# Patient Record
Sex: FEMALE | Race: WHITE | NOT HISPANIC OR LATINO | Employment: OTHER | URBAN - METROPOLITAN AREA
[De-identification: names, ages, dates, MRNs, and addresses within clinical notes are randomized per-mention and may not be internally consistent; named-entity substitution may affect disease eponyms.]

---

## 2024-09-05 ENCOUNTER — OFFICE VISIT (OUTPATIENT)
Dept: FAMILY MEDICINE CLINIC | Facility: CLINIC | Age: 74
End: 2024-09-05
Payer: COMMERCIAL

## 2024-09-05 VITALS
WEIGHT: 161 LBS | BODY MASS INDEX: 26.82 KG/M2 | DIASTOLIC BLOOD PRESSURE: 78 MMHG | SYSTOLIC BLOOD PRESSURE: 126 MMHG | RESPIRATION RATE: 16 BRPM | HEIGHT: 65 IN

## 2024-09-05 DIAGNOSIS — F41.9 ANXIETY: ICD-10-CM

## 2024-09-05 DIAGNOSIS — J34.89 LESION OF NOSE: ICD-10-CM

## 2024-09-05 DIAGNOSIS — M47.812 CERVICAL SPONDYLOSIS: ICD-10-CM

## 2024-09-05 DIAGNOSIS — Z76.89 ENCOUNTER TO ESTABLISH CARE WITH NEW DOCTOR: Primary | ICD-10-CM

## 2024-09-05 DIAGNOSIS — R19.7 DIARRHEA, UNSPECIFIED TYPE: ICD-10-CM

## 2024-09-05 DIAGNOSIS — T14.91XA SUICIDE ATTEMPT (HCC): ICD-10-CM

## 2024-09-05 DIAGNOSIS — N39.0 RECURRENT UTI: ICD-10-CM

## 2024-09-05 DIAGNOSIS — F31.4 BIPOLAR DISORDER, CURRENT EPISODE DEPRESSED, SEVERE, WITHOUT PSYCHOTIC FEATURES (HCC): ICD-10-CM

## 2024-09-05 DIAGNOSIS — J44.1 CHRONIC OBSTRUCTIVE PULMONARY DISEASE WITH (ACUTE) EXACERBATION (HCC): ICD-10-CM

## 2024-09-05 PROBLEM — F29 PSYCHOSIS (HCC): Status: ACTIVE | Noted: 2024-05-28

## 2024-09-05 PROBLEM — F32.A DEPRESSION: Status: ACTIVE | Noted: 2024-05-29

## 2024-09-05 PROCEDURE — 99204 OFFICE O/P NEW MOD 45 MIN: CPT

## 2024-09-05 PROCEDURE — G2211 COMPLEX E/M VISIT ADD ON: HCPCS

## 2024-09-05 RX ORDER — FLUOXETINE 40 MG/1
40 CAPSULE ORAL DAILY
COMMUNITY
Start: 2024-06-29

## 2024-09-05 RX ORDER — LAMOTRIGINE 200 MG/1
200 TABLET ORAL 2 TIMES DAILY
COMMUNITY
End: 2024-09-05 | Stop reason: SDUPTHER

## 2024-09-05 RX ORDER — BECLOMETHASONE DIPROPIONATE HFA 80 UG/1
2 AEROSOL, METERED RESPIRATORY (INHALATION) 2 TIMES DAILY
Qty: 10.6 G | Refills: 3 | Status: SHIPPED | OUTPATIENT
Start: 2024-09-05

## 2024-09-05 RX ORDER — CHLORAL HYDRATE 500 MG
1000 CAPSULE ORAL DAILY
COMMUNITY

## 2024-09-05 RX ORDER — METHENAMINE HIPPURATE 1000 MG/1
1 TABLET ORAL
COMMUNITY
Start: 2024-01-30 | End: 2025-01-29

## 2024-09-05 RX ORDER — BUSPIRONE HYDROCHLORIDE 15 MG/1
15 TABLET ORAL 2 TIMES DAILY
COMMUNITY
End: 2024-09-05 | Stop reason: SDUPTHER

## 2024-09-05 RX ORDER — TRAZODONE HYDROCHLORIDE 150 MG/1
300 TABLET ORAL
COMMUNITY
End: 2024-09-05 | Stop reason: SDUPTHER

## 2024-09-05 RX ORDER — CYCLOBENZAPRINE HCL 10 MG
10 TABLET ORAL 2 TIMES DAILY
Qty: 60 TABLET | Refills: 1 | Status: SHIPPED | OUTPATIENT
Start: 2024-09-05

## 2024-09-05 RX ORDER — ALBUTEROL SULFATE 90 UG/1
2 AEROSOL, METERED RESPIRATORY (INHALATION) EVERY 6 HOURS PRN
Qty: 18 G | Refills: 5 | Status: SHIPPED | OUTPATIENT
Start: 2024-09-05

## 2024-09-05 RX ORDER — LAMOTRIGINE 200 MG/1
200 TABLET ORAL 2 TIMES DAILY
Qty: 60 TABLET | Refills: 1 | Status: SHIPPED | OUTPATIENT
Start: 2024-09-05

## 2024-09-05 RX ORDER — CYANOCOBALAMIN (VITAMIN B-12) 500 MCG
400 LOZENGE ORAL DAILY
COMMUNITY

## 2024-09-05 RX ORDER — TRAZODONE HYDROCHLORIDE 150 MG/1
300 TABLET ORAL
Qty: 60 TABLET | Refills: 3 | Status: SHIPPED | OUTPATIENT
Start: 2024-09-05

## 2024-09-05 RX ORDER — BUDESONIDE 0.5 MG/2ML
0.5 INHALANT ORAL DAILY
COMMUNITY
Start: 2024-05-29 | End: 2024-09-05 | Stop reason: ALTCHOICE

## 2024-09-05 RX ORDER — BUSPIRONE HYDROCHLORIDE 15 MG/1
15 TABLET ORAL 2 TIMES DAILY
Qty: 60 TABLET | Refills: 3 | Status: SHIPPED | OUTPATIENT
Start: 2024-09-05

## 2024-09-05 RX ORDER — CYCLOBENZAPRINE HCL 10 MG
10 TABLET ORAL 2 TIMES DAILY
COMMUNITY
End: 2024-09-05 | Stop reason: SDUPTHER

## 2024-09-05 RX ORDER — IPRATROPIUM BROMIDE AND ALBUTEROL SULFATE 2.5; .5 MG/3ML; MG/3ML
3 SOLUTION RESPIRATORY (INHALATION)
COMMUNITY

## 2024-09-05 NOTE — ASSESSMENT & PLAN NOTE
Pt reports chronic diarrhea has a hx of C-diff secondary to frequent antibiotic use. Refer to GI to establish care, recommend probiotic.

## 2024-09-05 NOTE — ASSESSMENT & PLAN NOTE
Intentional drug overdose of lorazepam.  Pt had inpt psych admission 5-28-24 to 5-30-24. Denies suicidal thoughts or self harm.

## 2024-09-05 NOTE — ASSESSMENT & PLAN NOTE
Pt currently on lamotrigine 200 mg bid, trazodone 300 mg at hs, fluoxetine 40 mg qd. Pt reports continued depressive symptoms denies suicidal thoughts or self harm. Would like referral to psychiatry for medication management.

## 2024-09-05 NOTE — ASSESSMENT & PLAN NOTE
Pt declined surgical intervention by orthopedics and neurosurgery. Continue muscle relaxer cyclobenzaprine 10 mg bid and referral to physical therapy provided.

## 2024-09-05 NOTE — ASSESSMENT & PLAN NOTE
Pt reports hx of skin cancer, noticed lesion on tip of nose x 6 months. On exam no open area, small scab with surrounding erythema. Dermatology referral provided.

## 2024-09-05 NOTE — ASSESSMENT & PLAN NOTE
On exam pt with posterior RLL wheezing and diffused coarse rales anterior lobes. Recommend albuterol inhaler prn, insurance prefers Qvar inhaler dc Pulmicort and script sent to pharmacy. Pt educate must rinse mouth after each use, fu for worsening or persistent sxs.

## 2024-09-05 NOTE — PROGRESS NOTES
Ambulatory Visit  Name: Carola Wilkes      : 1950      MRN: [id number]  Encounter Provider: SHAWNEE Dubon  Encounter Date: 2024   Encounter department: [facility]    Assessment & Plan   1. Encounter to establish care with new doctor  2. Chronic obstructive pulmonary disease with (acute) exacerbation (HCC)  Assessment & Plan:  On exam pt with posterior RLL wheezing and diffused coarse rales anterior lobes. Recommend albuterol inhaler prn, insurance prefers Qvar inhaler dc Pulmicort and script sent to pharmacy. Pt educate must rinse mouth after each use, fu for worsening or persistent sxs.  Orders:  -     beclomethasone (Qvar RediHaler) 80 MCG/ACT inhaler; Inhale 2 puffs 2 (two) times a day Rinse mouth after use.  -     albuterol (Ventolin HFA) 90 mcg/act inhaler; Inhale 2 puffs every 6 (six) hours as needed for wheezing  3. Bipolar disorder, current episode depressed, severe, without psychotic features (HCC)  Assessment & Plan:  Pt currently on lamotrigine 200 mg bid, trazodone 300 mg at hs, fluoxetine 40 mg qd. Pt reports continued depressive symptoms denies suicidal thoughts or self harm. Would like referral to psychiatry for medication management.  Orders:  -     Ambulatory referral to Psych Services; Future  -     traZODone (DESYREL) 150 mg tablet; Take 2 tablets (300 mg total) by mouth daily at bedtime  -     lamoTRIgine (LaMICtal) 200 MG tablet; Take 1 tablet (200 mg total) by mouth 2 (two) times a day  4. Anxiety  -     Ambulatory referral to Psych Services; Future  -     busPIRone (BUSPAR) 15 mg tablet; Take 1 tablet (15 mg total) by mouth 2 (two) times a day  5. Cervical spondylosis  Assessment & Plan:  Pt declined surgical intervention by orthopedics and neurosurgery. Continue muscle relaxer cyclobenzaprine 10 mg bid and referral to physical therapy provided.  Orders:  -     Ambulatory Referral to Physical Therapy; Future  -     cyclobenzaprine  (FLEXERIL) 10 mg tablet; Take 1 tablet (10 mg total) by mouth 2 (two) times a day  6. Lesion of nose  Assessment & Plan:  Pt reports hx of skin cancer, noticed lesion on tip of nose x 6 months. On exam no open area, small scab with surrounding erythema. Dermatology referral provided.    Orders:  -     Ambulatory Referral to Dermatology; Future  7. Diarrhea, unspecified type  Assessment & Plan:  Pt reports chronic diarrhea has a hx of C-diff secondary to frequent antibiotic use. Refer to GI to establish care, recommend probiotic.   Orders:  -     Ambulatory Referral to Gastroenterology; Future  8. Suicide attempt (HCC)  Assessment & Plan:  Intentional drug overdose of lorazepam.  Pt had inpt psych admission 5-28-24 to 5-30-24. Denies suicidal thoughts or self harm.  9. Recurrent UTI  Assessment & Plan:  Last infection January 2024 requiring hospitalization and IV abx. Denies current symptoms      Depression Screening and Follow-up Plan: Patient's depression screening was positive with a PHQ-2 score of 4. Their PHQ-9 score was 10. Patient with underlying depression and was advised to continue current medications as prescribed.     Tobacco Cessation Counseling: Tobacco cessation counseling was provided. The patient is sincerely urged to quit consumption of tobacco. She is not ready to quit tobacco. Medication options and side effects of medication discussed. Patient refused medication.       History of Present Illness     Pt and daughter presents to establish care. Pt recently moved to the area from [place] and need referrals to establish care with specialists and  medication refill for chronic illness.     History of skin cancer has new spot on nose would like referral to dermatology    Bipolar 1 disorder - Currently on Trazodone 300 mg at hs, lamictal 200 mg bid, prozac 40 mg qd  referral for psychiatry    Anxiety disorder - Currently on buspirone 15 mg bid     COPD - Pt was previously on Symbicort, no longer  taking has Pulmicort nebulizer solution ordered however does not have medication. Pt does not foll with pulmonology states symptoms have been stable.    Hx of psychosis with suicide attempt after overdosing on lorazepam in May 2024. Pt was admitted for inpt psychiatric treatment 5-28-24 through 5-30-24       Cervical spondylosis - Pt reports evaluation by orthopedics and neuro surgery with recommendation for surgical intervention. Pt states declined surgery opting for conservative treatment, would like to pursue physical therapy    Recurrent UTI - Last infection January 2024 requiring hospitalization and IB avx. Denies current symptoms        Review of Systems   Constitutional:  Negative for activity change, appetite change, chills, diaphoresis, fatigue and fever.   HENT:  Negative for congestion, drooling, ear pain, hearing loss, nosebleeds, postnasal drip, rhinorrhea, sinus pressure, sinus pain, sore throat, trouble swallowing and voice change.    Eyes:  Negative for photophobia, pain, discharge, redness and visual disturbance.   Respiratory:  Negative for cough, chest tightness, shortness of breath and wheezing.    Cardiovascular:  Negative for chest pain, palpitations and leg swelling.   Gastrointestinal:  Negative for abdominal distention, abdominal pain, blood in stool, constipation, diarrhea, nausea, rectal pain and vomiting.   Endocrine: Negative for cold intolerance, heat intolerance, polydipsia, polyphagia and polyuria.   Genitourinary:  Negative for decreased urine volume, difficulty urinating, dysuria, flank pain, genital sores, hematuria, menstrual problem, pelvic pain, urgency, vaginal discharge and vaginal pain.   Musculoskeletal:  Negative for arthralgias, back pain, gait problem, myalgias, neck pain and neck stiffness.   Skin:  Positive for color change. Negative for pallor, rash and wound.   Allergic/Immunologic: Negative for environmental allergies, food allergies and immunocompromised state.    Neurological:  Negative for dizziness, tremors, seizures, syncope, facial asymmetry, weakness, light-headedness, numbness and headaches.   Hematological:  Negative for adenopathy.   Psychiatric/Behavioral:  Negative for agitation, behavioral problems, confusion, decreased concentration, dysphoric mood, hallucinations, self-injury, sleep disturbance and suicidal ideas. The patient is not nervous/anxious and is not hyperactive.    All other systems reviewed and are negative.    Current Outpatient Medications on File Prior to Visit   Medication Sig Dispense Refill    Acetaminophen 500 MG Take 1,000 mg by mouth every 6 (six) hours as needed      Cholecalciferol 25 MCG (1000 UT) CHEW Chew 1,000 Units daily      FLUoxetine (PROzac) 40 MG capsule Take 40 mg by mouth daily      ipratropium-albuterol (DUO-NEB) 0.5-2.5 mg/3 mL nebulizer solution Inhale 3 mL      methenamine hippurate (HIPREX) 1 g tablet Take 1 g by mouth      Multiple Vitamin (MULTI VITAMIN DAILY PO) Take 1 tablet by mouth daily      Omega-3 Fatty Acids (Omega-3 Fish Oil) 1000 MG CAPS 1,000 mg daily      Vitamin E 400 units TABS Take 400 Units by mouth daily      [DISCONTINUED] budesonide (PULMICORT) 0.5 mg/2 mL nebulizer solution Inhale 0.5 mg daily      [DISCONTINUED] busPIRone (BUSPAR) 15 mg tablet Take 15 mg by mouth 2 (two) times a day      [DISCONTINUED] cyclobenzaprine (FLEXERIL) 10 mg tablet Take 10 mg by mouth 2 (two) times a day      [DISCONTINUED] lamoTRIgine (LaMICtal) 200 MG tablet Take 200 mg by mouth 2 (two) times a day      [DISCONTINUED] traZODone (DESYREL) 150 mg tablet Take 300 mg by mouth       No current facility-administered medications on file prior to visit.      Social History     Tobacco Use    Smoking status: Every Day     Average packs/day: 0.3 packs/day for 19.0 years (4.8 ttl pk-yrs)     Types: Cigarettes     Start date: 2005    Smokeless tobacco: Not on file   Vaping Use    Vaping status: Some Days    Substances: THC  "  Substance and Sexual Activity    Alcohol use: Not Currently    Drug use: Never    Sexual activity: Not on file     Objective     /78   Resp 16   Ht 5' 5\" (1.651 m)   Wt 73 kg (161 lb)   BMI 26.79 kg/m²     Physical Exam  Vitals and nursing note reviewed. Exam conducted with a chaperone present.   Constitutional:       General: She is not in acute distress.     Appearance: Normal appearance. She is well-developed, well-groomed and overweight. She is not ill-appearing, toxic-appearing or diaphoretic.   HENT:      Head: Normocephalic and atraumatic.      Right Ear: Tympanic membrane, ear canal and external ear normal. There is no impacted cerumen.      Left Ear: Tympanic membrane, ear canal and external ear normal. There is no impacted cerumen.      Nose: Nose normal. No congestion or rhinorrhea.      Mouth/Throat:      Mouth: Mucous membranes are moist.      Pharynx: Oropharynx is clear. No oropharyngeal exudate or posterior oropharyngeal erythema.   Eyes:      General: No scleral icterus.        Right eye: No discharge.         Left eye: No discharge.      Extraocular Movements: Extraocular movements intact.      Conjunctiva/sclera: Conjunctivae normal.      Pupils: Pupils are equal, round, and reactive to light.   Neck:      Vascular: No carotid bruit.   Cardiovascular:      Rate and Rhythm: Normal rate and regular rhythm.      Pulses: Normal pulses.      Heart sounds: Normal heart sounds. No murmur heard.  Pulmonary:      Effort: Pulmonary effort is normal. No respiratory distress.      Breath sounds: Normal breath sounds. No stridor. No wheezing, rhonchi or rales.   Chest:      Chest wall: No tenderness.   Abdominal:      General: Bowel sounds are normal. There is no distension.      Palpations: Abdomen is soft. There is no mass.      Tenderness: There is no abdominal tenderness. There is no right CVA tenderness, left CVA tenderness, guarding or rebound.      Hernia: No hernia is present. "   Musculoskeletal:         General: No swelling, tenderness, deformity or signs of injury. Normal range of motion.      Cervical back: Normal range of motion and neck supple. No rigidity or tenderness.      Right lower leg: No edema.      Left lower leg: No edema.   Lymphadenopathy:      Cervical: No cervical adenopathy.   Skin:     General: Skin is warm.      Capillary Refill: Capillary refill takes less than 2 seconds.      Coloration: Skin is not jaundiced.      Findings: Erythema and lesion present. No bruising or rash.      Comments: Small scab with surrounding erythema at tip of nose   Neurological:      General: No focal deficit present.      Mental Status: She is alert and oriented to person, place, and time.      Cranial Nerves: Cranial nerves 2-12 are intact. No cranial nerve deficit.      Sensory: Sensation is intact. No sensory deficit.      Motor: Motor function is intact. No weakness.      Coordination: Coordination is intact. Romberg sign negative. Coordination normal. Finger-Nose-Finger Test and Heel to Shin Test normal. Rapid alternating movements normal.      Gait: Gait normal.      Deep Tendon Reflexes: Reflexes normal.   Psychiatric:         Attention and Perception: Attention and perception normal. She is attentive. She does not perceive auditory or visual hallucinations.         Mood and Affect: Mood and affect normal. Mood is not anxious, depressed or elated. Affect is not labile, blunt, flat, angry, tearful or inappropriate.         Speech: Speech normal. Speech is not rapid and pressured.         Behavior: Behavior normal. Behavior is not agitated, slowed, aggressive, withdrawn, hyperactive or combative. Behavior is cooperative.         Thought Content: Thought content normal. Thought content is not paranoid or delusional. Thought content does not include homicidal or suicidal ideation. Thought content does not include homicidal or suicidal plan.         Cognition and Memory: Cognition and  memory normal. Cognition is not impaired. Memory is not impaired.         Judgment: Judgment normal. Judgment is not impulsive or inappropriate.       Administrative Statements

## 2024-09-06 ENCOUNTER — TELEPHONE (OUTPATIENT)
Age: 74
End: 2024-09-06

## 2024-09-06 NOTE — TELEPHONE ENCOUNTER
Contacted Pt. in regards to ROUTINE Referral, Pt has been added to [facility] and [facility]      Please send resource Packet to address on file

## 2024-09-23 ENCOUNTER — RA CDI HCC (OUTPATIENT)
Dept: OTHER | Facility: HOSPITAL | Age: 74
End: 2024-09-23

## 2024-09-24 NOTE — PROGRESS NOTES
HCC coding opportunities          Chart Reviewed number of suggestions sent to Provider: 1  F31.4     Patients Insurance     Medicare Insurance: [employer]

## 2024-10-01 DIAGNOSIS — F31.4 BIPOLAR DISORDER, CURRENT EPISODE DEPRESSED, SEVERE, WITHOUT PSYCHOTIC FEATURES (HCC): ICD-10-CM

## 2024-10-01 RX ORDER — TRAZODONE HYDROCHLORIDE 150 MG/1
300 TABLET ORAL
Qty: 180 TABLET | Refills: 3 | Status: SHIPPED | OUTPATIENT
Start: 2024-10-01

## 2024-10-01 RX ORDER — LAMOTRIGINE 200 MG/1
200 TABLET ORAL 2 TIMES DAILY
Qty: 180 TABLET | Refills: 1 | Status: SHIPPED | OUTPATIENT
Start: 2024-10-01

## 2024-10-05 PROBLEM — N39.0 RECURRENT UTI: Status: RESOLVED | Noted: 2024-09-05 | Resolved: 2024-10-05

## 2024-10-31 DIAGNOSIS — M47.812 CERVICAL SPONDYLOSIS: ICD-10-CM

## 2024-10-31 RX ORDER — CYCLOBENZAPRINE HCL 10 MG
10 TABLET ORAL 2 TIMES DAILY
Qty: 60 TABLET | Refills: 1 | Status: SHIPPED | OUTPATIENT
Start: 2024-10-31

## 2024-11-14 ENCOUNTER — TELEPHONE (OUTPATIENT)
Dept: FAMILY MEDICINE CLINIC | Facility: CLINIC | Age: 74
End: 2024-11-14

## 2024-11-15 DIAGNOSIS — F41.9 ANXIETY: ICD-10-CM

## 2024-11-15 DIAGNOSIS — F31.4 BIPOLAR DISORDER, CURRENT EPISODE DEPRESSED, SEVERE, WITHOUT PSYCHOTIC FEATURES (HCC): ICD-10-CM

## 2024-11-15 DIAGNOSIS — M47.812 CERVICAL SPONDYLOSIS: ICD-10-CM

## 2024-11-15 DIAGNOSIS — J44.1 CHRONIC OBSTRUCTIVE PULMONARY DISEASE WITH (ACUTE) EXACERBATION (HCC): ICD-10-CM

## 2024-11-15 RX ORDER — TRAZODONE HYDROCHLORIDE 150 MG/1
300 TABLET ORAL
Qty: 180 TABLET | Refills: 3 | Status: SHIPPED | OUTPATIENT
Start: 2024-11-15 | End: 2024-11-21 | Stop reason: SDUPTHER

## 2024-11-15 RX ORDER — BUSPIRONE HYDROCHLORIDE 15 MG/1
15 TABLET ORAL 2 TIMES DAILY
Qty: 60 TABLET | Refills: 3 | Status: SHIPPED | OUTPATIENT
Start: 2024-11-15 | End: 2024-11-21 | Stop reason: SDUPTHER

## 2024-11-15 RX ORDER — ALBUTEROL SULFATE 90 UG/1
2 INHALANT RESPIRATORY (INHALATION) EVERY 6 HOURS PRN
Qty: 18 G | Refills: 5 | Status: SHIPPED | OUTPATIENT
Start: 2024-11-15

## 2024-11-15 RX ORDER — LAMOTRIGINE 200 MG/1
200 TABLET ORAL 2 TIMES DAILY
Qty: 180 TABLET | Refills: 1 | Status: SHIPPED | OUTPATIENT
Start: 2024-11-15 | End: 2024-11-21 | Stop reason: SDUPTHER

## 2024-11-15 RX ORDER — CYCLOBENZAPRINE HCL 10 MG
10 TABLET ORAL 2 TIMES DAILY
Qty: 60 TABLET | Refills: 1 | OUTPATIENT
Start: 2024-11-15

## 2024-11-15 RX ORDER — BECLOMETHASONE DIPROPIONATE HFA 80 UG/1
2 AEROSOL, METERED RESPIRATORY (INHALATION) 2 TIMES DAILY
Qty: 10.6 G | Refills: 3 | Status: SHIPPED | OUTPATIENT
Start: 2024-11-15

## 2024-11-21 DIAGNOSIS — F31.4 BIPOLAR DISORDER, CURRENT EPISODE DEPRESSED, SEVERE, WITHOUT PSYCHOTIC FEATURES (HCC): ICD-10-CM

## 2024-11-21 RX ORDER — TRAZODONE HYDROCHLORIDE 150 MG/1
300 TABLET ORAL
Qty: 180 TABLET | Refills: 3 | OUTPATIENT
Start: 2024-11-21

## 2024-11-21 RX ORDER — METHENAMINE HIPPURATE 1000 MG/1
1 TABLET ORAL
OUTPATIENT
Start: 2024-11-21 | End: 2025-11-21

## 2024-11-21 NOTE — TELEPHONE ENCOUNTER
She has been out of them for 3 days and have not sleept going forward would like the sent to mail order

## 2024-11-27 DIAGNOSIS — F31.4 BIPOLAR DISORDER, CURRENT EPISODE DEPRESSED, SEVERE, WITHOUT PSYCHOTIC FEATURES (HCC): ICD-10-CM

## 2024-11-27 RX ORDER — LAMOTRIGINE 200 MG/1
200 TABLET ORAL 2 TIMES DAILY
Qty: 180 TABLET | Refills: 0 | OUTPATIENT
Start: 2024-11-27

## 2024-12-03 DIAGNOSIS — F31.4 BIPOLAR DISORDER, CURRENT EPISODE DEPRESSED, SEVERE, WITHOUT PSYCHOTIC FEATURES (HCC): ICD-10-CM

## 2024-12-03 DIAGNOSIS — J44.1 CHRONIC OBSTRUCTIVE PULMONARY DISEASE WITH (ACUTE) EXACERBATION (HCC): ICD-10-CM

## 2024-12-03 RX ORDER — BECLOMETHASONE DIPROPIONATE HFA 80 UG/1
2 AEROSOL, METERED RESPIRATORY (INHALATION) 2 TIMES DAILY
Qty: 10.6 G | Refills: 3 | OUTPATIENT
Start: 2024-12-03

## 2024-12-03 RX ORDER — LAMOTRIGINE 200 MG/1
200 TABLET ORAL 2 TIMES DAILY
Qty: 180 TABLET | Refills: 0 | OUTPATIENT
Start: 2024-12-03

## 2024-12-03 RX ORDER — ALBUTEROL SULFATE 90 UG/1
2 INHALANT RESPIRATORY (INHALATION) EVERY 6 HOURS PRN
Qty: 18 G | Refills: 5 | OUTPATIENT
Start: 2024-12-03

## 2024-12-23 DIAGNOSIS — F31.4 BIPOLAR DISORDER, CURRENT EPISODE DEPRESSED, SEVERE, WITHOUT PSYCHOTIC FEATURES (HCC): ICD-10-CM

## 2024-12-23 RX ORDER — LAMOTRIGINE 200 MG/1
200 TABLET ORAL 2 TIMES DAILY
Qty: 180 TABLET | Refills: 0 | Status: SHIPPED | OUTPATIENT
Start: 2024-12-23

## 2025-02-03 DIAGNOSIS — F31.4 BIPOLAR DISORDER, CURRENT EPISODE DEPRESSED, SEVERE, WITHOUT PSYCHOTIC FEATURES (HCC): ICD-10-CM

## 2025-02-03 RX ORDER — TRAZODONE HYDROCHLORIDE 150 MG/1
300 TABLET ORAL
Qty: 180 TABLET | Refills: 1 | Status: SHIPPED | OUTPATIENT
Start: 2025-02-03

## 2025-02-21 DIAGNOSIS — F41.9 ANXIETY: ICD-10-CM

## 2025-02-21 RX ORDER — BUSPIRONE HYDROCHLORIDE 15 MG/1
15 TABLET ORAL 2 TIMES DAILY
Qty: 180 TABLET | Refills: 1 | Status: SHIPPED | OUTPATIENT
Start: 2025-02-21

## 2025-02-24 ENCOUNTER — RA CDI HCC (OUTPATIENT)
Dept: OTHER | Facility: HOSPITAL | Age: 75
End: 2025-02-24

## 2025-02-24 NOTE — PROGRESS NOTES
HCC coding opportunities          Chart Reviewed number of suggestions sent to Provider: 1   F31.9    Patients Insurance     Medicare Insurance: [employer]

## 2025-03-28 ENCOUNTER — RA CDI HCC (OUTPATIENT)
Dept: OTHER | Facility: HOSPITAL | Age: 75
End: 2025-03-28

## 2025-03-29 NOTE — PROGRESS NOTES
HCC coding opportunities     F31.4, J44.9     Chart Reviewed number of suggestions sent to Provider: 2     Patients Insurance     Medicare Insurance: [employer]

## 2025-03-31 ENCOUNTER — APPOINTMENT (OUTPATIENT)
Dept: LAB | Facility: CLINIC | Age: 75
End: 2025-03-31
Payer: COMMERCIAL

## 2025-03-31 ENCOUNTER — TELEPHONE (OUTPATIENT)
Age: 75
End: 2025-03-31

## 2025-03-31 ENCOUNTER — OFFICE VISIT (OUTPATIENT)
Dept: FAMILY MEDICINE CLINIC | Facility: CLINIC | Age: 75
End: 2025-03-31
Payer: COMMERCIAL

## 2025-03-31 VITALS
TEMPERATURE: 99 F | WEIGHT: 173 LBS | BODY MASS INDEX: 28.82 KG/M2 | HEART RATE: 74 BPM | RESPIRATION RATE: 15 BRPM | HEIGHT: 65 IN | SYSTOLIC BLOOD PRESSURE: 150 MMHG | OXYGEN SATURATION: 95 % | DIASTOLIC BLOOD PRESSURE: 80 MMHG

## 2025-03-31 DIAGNOSIS — R35.0 URINARY FREQUENCY: ICD-10-CM

## 2025-03-31 DIAGNOSIS — F41.9 ANXIETY: ICD-10-CM

## 2025-03-31 DIAGNOSIS — R03.0 ELEVATED BLOOD PRESSURE READING: ICD-10-CM

## 2025-03-31 DIAGNOSIS — J44.1 CHRONIC OBSTRUCTIVE PULMONARY DISEASE WITH (ACUTE) EXACERBATION (HCC): ICD-10-CM

## 2025-03-31 DIAGNOSIS — B35.1 TOENAIL FUNGUS: ICD-10-CM

## 2025-03-31 DIAGNOSIS — Z13.6 SCREENING FOR CARDIOVASCULAR CONDITION: ICD-10-CM

## 2025-03-31 DIAGNOSIS — Z00.00 MEDICARE ANNUAL WELLNESS VISIT, SUBSEQUENT: Primary | ICD-10-CM

## 2025-03-31 DIAGNOSIS — F25.9 SCHIZOAFFECTIVE DISORDER, UNSPECIFIED TYPE (HCC): ICD-10-CM

## 2025-03-31 DIAGNOSIS — N30.00 ACUTE CYSTITIS WITHOUT HEMATURIA: ICD-10-CM

## 2025-03-31 DIAGNOSIS — Z78.0 ASYMPTOMATIC POSTMENOPAUSAL STATE: ICD-10-CM

## 2025-03-31 LAB
ALBUMIN SERPL BCG-MCNC: 4.3 G/DL (ref 3.5–5)
ALP SERPL-CCNC: 85 U/L (ref 34–104)
ALT SERPL W P-5'-P-CCNC: 26 U/L (ref 7–52)
ANION GAP SERPL CALCULATED.3IONS-SCNC: 8 MMOL/L (ref 4–13)
AST SERPL W P-5'-P-CCNC: 23 U/L (ref 13–39)
BASOPHILS # BLD AUTO: 0.06 THOUSANDS/ÂΜL (ref 0–0.1)
BASOPHILS NFR BLD AUTO: 1 % (ref 0–1)
BILIRUB SERPL-MCNC: 0.6 MG/DL (ref 0.2–1)
BUN SERPL-MCNC: 11 MG/DL (ref 5–25)
CALCIUM SERPL-MCNC: 9.2 MG/DL (ref 8.4–10.2)
CHLORIDE SERPL-SCNC: 103 MMOL/L (ref 96–108)
CHOLEST SERPL-MCNC: 297 MG/DL (ref ?–200)
CO2 SERPL-SCNC: 29 MMOL/L (ref 21–32)
CREAT SERPL-MCNC: 0.65 MG/DL (ref 0.6–1.3)
EOSINOPHIL # BLD AUTO: 0.16 THOUSAND/ÂΜL (ref 0–0.61)
EOSINOPHIL NFR BLD AUTO: 2 % (ref 0–6)
ERYTHROCYTE [DISTWIDTH] IN BLOOD BY AUTOMATED COUNT: 13.2 % (ref 11.6–15.1)
GFR SERPL CREATININE-BSD FRML MDRD: 86 ML/MIN/1.73SQ M
GLUCOSE P FAST SERPL-MCNC: 98 MG/DL (ref 65–99)
HCT VFR BLD AUTO: 40.8 % (ref 34.8–46.1)
HDLC SERPL-MCNC: 52 MG/DL
HGB BLD-MCNC: 13.1 G/DL (ref 11.5–15.4)
IMM GRANULOCYTES # BLD AUTO: 0.1 THOUSAND/UL (ref 0–0.2)
IMM GRANULOCYTES NFR BLD AUTO: 1 % (ref 0–2)
LDLC SERPL CALC-MCNC: 167 MG/DL (ref 0–100)
LYMPHOCYTES # BLD AUTO: 2.64 THOUSANDS/ÂΜL (ref 0.6–4.47)
LYMPHOCYTES NFR BLD AUTO: 24 % (ref 14–44)
MCH RBC QN AUTO: 32 PG (ref 26.8–34.3)
MCHC RBC AUTO-ENTMCNC: 32.1 G/DL (ref 31.4–37.4)
MCV RBC AUTO: 100 FL (ref 82–98)
MONOCYTES # BLD AUTO: 0.84 THOUSAND/ÂΜL (ref 0.17–1.22)
MONOCYTES NFR BLD AUTO: 8 % (ref 4–12)
NEUTROPHILS # BLD AUTO: 7.01 THOUSANDS/ÂΜL (ref 1.85–7.62)
NEUTS SEG NFR BLD AUTO: 64 % (ref 43–75)
NONHDLC SERPL-MCNC: 245 MG/DL
NRBC BLD AUTO-RTO: 0 /100 WBCS
PLATELET # BLD AUTO: 248 THOUSANDS/UL (ref 149–390)
PMV BLD AUTO: 10 FL (ref 8.9–12.7)
POTASSIUM SERPL-SCNC: 4 MMOL/L (ref 3.5–5.3)
PROT SERPL-MCNC: 6.9 G/DL (ref 6.4–8.4)
RBC # BLD AUTO: 4.09 MILLION/UL (ref 3.81–5.12)
SL AMB  POCT GLUCOSE, UA: NEGATIVE
SL AMB LEUKOCYTE ESTERASE,UA: 500
SL AMB POCT BILIRUBIN,UA: 1
SL AMB POCT BLOOD,UA: NEGATIVE
SL AMB POCT CLARITY,UA: NORMAL
SL AMB POCT COLOR,UA: YELLOW
SL AMB POCT KETONES,UA: NEGATIVE
SL AMB POCT NITRITE,UA: NEGATIVE
SL AMB POCT PH,UA: 6
SL AMB POCT SPECIFIC GRAVITY,UA: 1.03
SL AMB POCT URINE PROTEIN: 15
SL AMB POCT UROBILINOGEN: 0.2
SODIUM SERPL-SCNC: 140 MMOL/L (ref 135–147)
TRIGL SERPL-MCNC: 388 MG/DL (ref ?–150)
WBC # BLD AUTO: 10.81 THOUSAND/UL (ref 4.31–10.16)

## 2025-03-31 PROCEDURE — 87181 SC STD AGAR DILUTION PER AGT: CPT

## 2025-03-31 PROCEDURE — 36415 COLL VENOUS BLD VENIPUNCTURE: CPT

## 2025-03-31 PROCEDURE — 87086 URINE CULTURE/COLONY COUNT: CPT

## 2025-03-31 PROCEDURE — 85025 COMPLETE CBC W/AUTO DIFF WBC: CPT

## 2025-03-31 PROCEDURE — G2211 COMPLEX E/M VISIT ADD ON: HCPCS

## 2025-03-31 PROCEDURE — 87186 SC STD MICRODIL/AGAR DIL: CPT

## 2025-03-31 PROCEDURE — G0439 PPPS, SUBSEQ VISIT: HCPCS

## 2025-03-31 PROCEDURE — 87077 CULTURE AEROBIC IDENTIFY: CPT

## 2025-03-31 PROCEDURE — 81002 URINALYSIS NONAUTO W/O SCOPE: CPT

## 2025-03-31 PROCEDURE — 99214 OFFICE O/P EST MOD 30 MIN: CPT

## 2025-03-31 PROCEDURE — 80061 LIPID PANEL: CPT

## 2025-03-31 PROCEDURE — 80053 COMPREHEN METABOLIC PANEL: CPT

## 2025-03-31 RX ORDER — SULFAMETHOXAZOLE AND TRIMETHOPRIM 800; 160 MG/1; MG/1
1 TABLET ORAL EVERY 12 HOURS SCHEDULED
Qty: 10 TABLET | Refills: 0 | Status: SHIPPED | OUTPATIENT
Start: 2025-03-31 | End: 2025-04-05

## 2025-03-31 RX ORDER — ZIPRASIDONE HYDROCHLORIDE 20 MG/1
20 CAPSULE ORAL DAILY
COMMUNITY
Start: 2025-03-12

## 2025-03-31 NOTE — PATIENT INSTRUCTIONS
Medicare Preventive Visit Patient Instructions  Thank you for completing your Welcome to Medicare Visit or Medicare Annual Wellness Visit today. Your next wellness visit will be due in one year (4/1/2026).  The screening/preventive services that you may require over the next 5-10 years are detailed below. Some tests may not apply to you based off risk factors and/or age. Screening tests ordered at today's visit but not completed yet may show as past due. Also, please note that scanned in results may not display below.  Preventive Screenings:  Service Recommendations Previous Testing/Comments   Colorectal Cancer Screening  * Colonoscopy    * Fecal Occult Blood Test (FOBT)/Fecal Immunochemical Test (FIT)  * Fecal DNA/Cologuard Test  * Flexible Sigmoidoscopy Age: 45-75 years old   Colonoscopy: every 10 years (may be performed more frequently if at higher risk)  OR  FOBT/FIT: every 1 year  OR  Cologuard: every 3 years  OR  Sigmoidoscopy: every 5 years  Screening may be recommended earlier than age 45 if at higher risk for colorectal cancer. Also, an individualized decision between you and your healthcare provider will decide whether screening between the ages of 76-85 would be appropriate. Colonoscopy: Not on file  FOBT/FIT: Not on file  Cologuard: Not on file  Sigmoidoscopy: Not on file          Breast Cancer Screening Age: 40+ years old  Frequency: every 1-2 years  Not required if history of left and right mastectomy Mammogram: Not on file        Cervical Cancer Screening Between the ages of 21-29, pap smear recommended once every 3 years.   Between the ages of 30-65, can perform pap smear with HPV co-testing every 5 years.   Recommendations may differ for women with a history of total hysterectomy, cervical cancer, or abnormal pap smears in past. Pap Smear: Not on file    Screening Not Indicated   Hepatitis C Screening Once for adults born between 1945 and 1965  More frequently in patients at high risk for Hepatitis  C Hep C Antibody: Not on file        Diabetes Screening 1-2 times per year if you're at risk for diabetes or have pre-diabetes Fasting glucose: No results in last 5 years (No results in last 5 years)  A1C: No results in last 5 years (No results in last 5 years)      Cholesterol Screening Once every 5 years if you don't have a lipid disorder. May order more often based on risk factors. Lipid panel: Not on file          Other Preventive Screenings Covered by Medicare:  Abdominal Aortic Aneurysm (AAA) Screening: covered once if your at risk. You're considered to be at risk if you have a family history of AAA.  Lung Cancer Screening: covers low dose CT scan once per year if you meet all of the following conditions: (1) Age 55-77; (2) No signs or symptoms of lung cancer; (3) Current smoker or have quit smoking within the last 15 years; (4) You have a tobacco smoking history of at least 20 pack years (packs per day multiplied by number of years you smoked); (5) You get a written order from a healthcare provider.  Glaucoma Screening: covered annually if you're considered high risk: (1) You have diabetes OR (2) Family history of glaucoma OR (3)  aged 50 and older OR (4)  American aged 65 and older  Osteoporosis Screening: covered every 2 years if you meet one of the following conditions: (1) You're estrogen deficient and at risk for osteoporosis based off medical history and other findings; (2) Have a vertebral abnormality; (3) On glucocorticoid therapy for more than 3 months; (4) Have primary hyperparathyroidism; (5) On osteoporosis medications and need to assess response to drug therapy.   Last bone density test (DXA Scan): Not on file.  HIV Screening: covered annually if you're between the age of 15-65. Also covered annually if you are younger than 15 and older than 65 with risk factors for HIV infection. For pregnant patients, it is covered up to 3 times per  pregnancy.    Immunizations:  Immunization Recommendations   Influenza Vaccine Annual influenza vaccination during flu season is recommended for all persons aged >= 6 months who do not have contraindications   Pneumococcal Vaccine   * Pneumococcal conjugate vaccine = PCV13 (Prevnar 13), PCV15 (Vaxneuvance), PCV20 (Prevnar 20)  * Pneumococcal polysaccharide vaccine = PPSV23 (Pneumovax) Adults 19-63 yo with certain risk factors or if 65+ yo  If never received any pneumonia vaccine: recommend Prevnar 20 (PCV20)  Give PCV20 if previously received 1 dose of PCV13 or PPSV23   Hepatitis B Vaccine 3 dose series if at intermediate or high risk (ex: diabetes, end stage renal disease, liver disease)   Respiratory syncytial virus (RSV) Vaccine - COVERED BY MEDICARE PART D  * RSVPreF3 (Arexvy) CDC recommends that adults 60 years of age and older may receive a single dose of RSV vaccine using shared clinical decision-making (SCDM)   Tetanus (Td) Vaccine - COST NOT COVERED BY MEDICARE PART B Following completion of primary series, a booster dose should be given every 10 years to maintain immunity against tetanus. Td may also be given as tetanus wound prophylaxis.   Tdap Vaccine - COST NOT COVERED BY MEDICARE PART B Recommended at least once for all adults. For pregnant patients, recommended with each pregnancy.   Shingles Vaccine (Shingrix) - COST NOT COVERED BY MEDICARE PART B  2 shot series recommended in those 19 years and older who have or will have weakened immune systems or those 50 years and older     Health Maintenance Due:      Topic Date Due   • Hepatitis C Screening  Never done     Immunizations Due:      Topic Date Due   • Pneumococcal Vaccine: 65+ Years (2 of 2 - PCV) 09/24/2016   • Influenza Vaccine (1) 09/01/2024   • COVID-19 Vaccine (4 - 2024-25 season) 09/01/2024     Advance Directives   What are advance directives?  Advance directives are legal documents that state your wishes and plans for medical care. These  plans are made ahead of time in case you lose your ability to make decisions for yourself. Advance directives can apply to any medical decision, such as the treatments you want, and if you want to donate organs.   What are the types of advance directives?  There are many types of advance directives, and each state has rules about how to use them. You may choose a combination of any of the following:  Living will:  This is a written record of the treatment you want. You can also choose which treatments you do not want, which to limit, and which to stop at a certain time. This includes surgery, medicine, IV fluid, and tube feedings.   Durable power of  for healthcare (DPAHC):  This is a written record that states who you want to make healthcare choices for you when you are unable to make them for yourself. This person, called a proxy, is usually a family member or a friend. You may choose more than 1 proxy.  Do not resuscitate (DNR) order:  A DNR order is used in case your heart stops beating or you stop breathing. It is a request not to have certain forms of treatment, such as CPR. A DNR order may be included in other types of advance directives.  Medical directive:  This covers the care that you want if you are in a coma, near death, or unable to make decisions for yourself. You can list the treatments you want for each condition. Treatment may include pain medicine, surgery, blood transfusions, dialysis, IV or tube feedings, and a ventilator (breathing machine).  Values history:  This document has questions about your views, beliefs, and how you feel and think about life. This information can help others choose the care that you would choose.  Why are advance directives important?  An advance directive helps you control your care. Although spoken wishes may be used, it is better to have your wishes written down. Spoken wishes can be misunderstood, or not followed. Treatments may be given even if you do not  want them. An advance directive may make it easier for your family to make difficult choices about your care.   Cigarette Smoking and Your Health   Risks to your health if you smoke:  Nicotine and other chemicals found in tobacco damage every cell in your body. Even if you are a light smoker, you have an increased risk for cancer, heart disease, and lung disease. If you are pregnant or have diabetes, smoking increases your risk for complications.   Benefits to your health if you stop smoking:   You decrease respiratory symptoms such as coughing, wheezing, and shortness of breath.   You reduce your risk for cancers of the lung, mouth, throat, kidney, bladder, pancreas, stomach, and cervix. If you already have cancer, you increase the benefits of chemotherapy. You also reduce your risk for cancer returning or a second cancer from developing.   You reduce your risk for heart disease, blood clots, heart attack, and stroke.   You reduce your risk for lung infections, and diseases such as pneumonia, asthma, chronic bronchitis, and emphysema.  Your circulation improves. More oxygen can be delivered to your body. If you have diabetes, you lower your risk for complications, such as kidney, artery, and eye diseases. You also lower your risk for nerve damage. Nerve damage can lead to amputations, poor vision, and blindness.  You improve your body's ability to heal and to fight infections.  For more information and support to stop smoking:   [employer]  Phone: 1- 321 - [phone]  Web Address: www.[employer]  Weight Management   Why it is important to manage your weight:  Being overweight increases your risk of health conditions such as heart disease, high blood pressure, type 2 diabetes, and certain types of cancer. It can also increase your risk for osteoarthritis, sleep apnea, and other respiratory problems. Aim for a slow, steady weight loss. Even a small amount of weight loss can lower your risk of health  problems.  How to lose weight safely:  A safe and healthy way to lose weight is to eat fewer calories and get regular exercise. You can lose up about 1 pound a week by decreasing the number of calories you eat by 500 calories each day.   Healthy meal plan for weight management:  A healthy meal plan includes a variety of foods, contains fewer calories, and helps you stay healthy. A healthy meal plan includes the following:  Eat whole-grain foods more often.  A healthy meal plan should contain fiber. Fiber is the part of grains, fruits, and vegetables that is not broken down by your body. Whole-grain foods are healthy and provide extra fiber in your diet. Some examples of whole-grain foods are whole-wheat breads and pastas, oatmeal, brown rice, and bulgur.  Eat a variety of vegetables every day.  Include dark, leafy greens such as spinach, kale, van greens, and mustard greens. Eat yellow and orange vegetables such as carrots, sweet potatoes, and winter squash.   Eat a variety of fruits every day.  Choose fresh or canned fruit (canned in its own juice or light syrup) instead of juice. Fruit juice has very little or no fiber.  Eat low-fat dairy foods.  Drink fat-free (skim) milk or 1% milk. Eat fat-free yogurt and low-fat cottage cheese. Try low-fat cheeses such as mozzarella and other reduced-fat cheeses.  Choose meat and other protein foods that are low in fat.  Choose beans or other legumes such as split peas or lentils. Choose fish, skinless poultry (chicken or turkey), or lean cuts of red meat (beef or pork). Before you cook meat or poultry, cut off any visible fat.   Use less fat and oil.  Try baking foods instead of frying them. Add less fat, such as margarine, sour cream, regular salad dressing and mayonnaise to foods. Eat fewer high-fat foods. Some examples of high-fat foods include french fries, doughnuts, ice cream, and cakes.  Eat fewer sweets.  Limit foods and drinks that are high in sugar. This  includes candy, cookies, regular soda, and sweetened drinks.  Exercise:  Exercise at least 30 minutes per day on most days of the week. Some examples of exercise include walking, biking, dancing, and swimming. You can also fit in more physical activity by taking the stairs instead of the elevator or parking farther away from stores. Ask your healthcare provider about the best exercise plan for you.      © Copyright [employer] 2018 Information is for End User's use only and may not be sold, redistributed or otherwise used for commercial purposes. All illustrations and images included in CareNotes® are the copyrighted property of [employer] or [employer]

## 2025-03-31 NOTE — TELEPHONE ENCOUNTER
Contacted patient off of Medication Management  to verify needs of services in attempts to offer patient an virtual appointment with [facility] provider. LVM for patient to contact intake dept  in regards to wait list.

## 2025-03-31 NOTE — PROGRESS NOTES
Name: Carola Wilkes      : 1950      MRN: [id number]  Encounter Provider: SHAWNEE Dubon  Encounter Date: 3/31/2025   Encounter department: [facility]    Assessment & Plan  Medicare annual wellness visit, subsequent  CPE done, routone labs Dexa scan ordered. Pt s/p b/l mastectomy, cervical cancer and colo rectal cancer screenings not indicated.        Screening for cardiovascular condition    Orders:  •  Lipid panel    Anxiety  Pt follows with psychiatrist continue buspirone 15 mg bid,   Orders:  •  Comprehensive metabolic panel    Urinary frequency    Orders:  •  POCT urine dip    Schizoaffective disorder, unspecified type (HCC)  Stable on current medication regimen follows with psychiatrist  has upcoming appointment.       Chronic obstructive pulmonary disease with (acute) exacerbation (HCC)  Stable on albuterol and Qvar inhalers.  Orders:  •  CBC and differential    Asymptomatic postmenopausal state    Orders:  •  DXA bone density spine hip and pelvis; Future    Acute cystitis without hematuria  Pt with urinary  frequency , dysuria for several days. Urine dip in office 3+ leukocytes negative nitrites. Will empirically start Bactrim bid x 5 days and send urine for culture. Pt encouraged to  increase fluids.  Orders:  •  Urine culture; Future  •  sulfamethoxazole-trimethoprim (BACTRIM DS) 800-160 mg per tablet; Take 1 tablet by mouth every 12 (twelve) hours for 5 days    Toenail fungus  B/l great toe with yellow discoloration and thickening. Pt requesting podiatry referral  Orders:  •  Ambulatory Referral to Podiatry; Future    BMI 28.0-28.9,adult  Pt interested in weight loss medication, discuss BMI <30 without pre-existing co morbidities, will refer to weight management.  Orders:  •  Ambulatory Referral to Weight Management; Future    Elevated blood pressure reading  BP elevated in office, pt is anxious reports normally stable BP  at home. Recommend monitoring  and f/u for consistently elevated reading          Preventive health issues were discussed with patient, and age appropriate screening tests were ordered as noted in patient's After Visit Summary. Personalized health advice and appropriate referrals for health education or preventive services given if needed, as noted in patient's After Visit Summary.    History of Present Illness     Pt and daughter presents for AWV, would like to discuss weight loss medication       Patient Care Team:  SHAWNEE Dubon as PCP - General (Family Medicine)  Oscar Elder MD as PCP - [facility]    Review of Systems   Constitutional:  Negative for activity change, appetite change, chills, diaphoresis, fatigue and fever.   HENT:  Negative for congestion, drooling, ear pain, hearing loss, nosebleeds, postnasal drip, rhinorrhea, sinus pressure, sinus pain, sore throat, trouble swallowing and voice change.    Eyes:  Negative for photophobia, pain, discharge, redness and visual disturbance.   Respiratory:  Negative for cough, chest tightness, shortness of breath and wheezing.    Cardiovascular:  Negative for chest pain, palpitations and leg swelling.   Gastrointestinal:  Negative for abdominal distention, abdominal pain, blood in stool, constipation, diarrhea, nausea, rectal pain and vomiting.   Endocrine: Negative for cold intolerance, heat intolerance, polydipsia, polyphagia and polyuria.   Genitourinary:  Positive for dysuria, frequency and urgency. Negative for decreased urine volume, difficulty urinating, flank pain, genital sores, hematuria, menstrual problem, pelvic pain, vaginal discharge and vaginal pain.   Musculoskeletal:  Negative for arthralgias, back pain, gait problem, myalgias, neck pain and neck stiffness.   Skin:  Negative for color change, rash and wound.   Allergic/Immunologic: Negative for environmental allergies, food allergies and immunocompromised state.   Neurological:  Negative  for dizziness, tremors, seizures, syncope, facial asymmetry, weakness, light-headedness and numbness.   Hematological:  Negative for adenopathy.   Psychiatric/Behavioral:  Negative for agitation, behavioral problems, confusion, decreased concentration, dysphoric mood, hallucinations, self-injury, sleep disturbance and suicidal ideas. The patient is nervous/anxious. The patient is not hyperactive.    All other systems reviewed and are negative.    Medical History Reviewed by provider this encounter:       Annual Wellness Visit Questionnaire   Carola is here for her Subsequent Wellness visit.     Health Risk Assessment:   Patient rates overall health as good. Patient feels that their physical health rating is same. Patient is very dissatisfied with their life. Eyesight was rated as slightly better. Hearing was rated as slightly worse. Patient feels that their emotional and mental health rating is same. Patients states they are never, rarely angry. Patient states they are always unusually tired/fatigued. Pain experienced in the last 7 days has been none. Patient states that she has experienced weight loss or gain in last 6 months.     Depression Screening:   PHQ-2 Score: 6  PHQ-9 Score: 21      Fall Risk Screening:   In the past year, patient has experienced: no history of falling in past year      Urinary Incontinence Screening:   Patient has not leaked urine accidently in the last six months.     Home Safety:  Patient does not have trouble with stairs inside or outside of their home. Patient has working smoke alarms and has working carbon monoxide detector. Home safety hazards include: none.     Nutrition:   Current diet is Regular.     Medications:   Patient is currently taking over-the-counter supplements. OTC medications include: see medication list. Patient is able to manage medications. Vitamin e and d and fish oil    Activities of Daily Living (ADLs)/Instrumental Activities of Daily Living (IADLs):   Walk and  transfer into and out of bed and chair?: Yes  Dress and groom yourself?: Yes    Bathe or shower yourself?: Yes    Feed yourself? Yes  Do your laundry/housekeeping?: Yes  Manage your money, pay your bills and track your expenses?: Yes  Make your own meals?: Yes    Do your own shopping?: Yes    Previous Hospitalizations:   Any hospitalizations or ED visits within the last 12 months?: Yes    How many hospitalizations have you had in the last year?: 1-2    Advance Care Planning:   Living will: Yes    Advanced directive: Yes    Advanced directive counseling given: Yes    End of Life Decisions reviewed with patient: Yes    Provider agrees with end of life decisions: Yes      Cognitive Screening:   Provider or family/friend/caregiver concerned regarding cognition?: No    PREVENTIVE SCREENINGS      Cardiovascular Screening:    General: Risks and Benefits Discussed    Due for: Lipid Panel      Diabetes Screening:     General: Risks and Benefits Discussed    Due for: Blood Glucose      Colorectal Cancer Screening:     General: Screening Not Indicated      Breast Cancer Screening:     General: Screening Not Indicated and History Breast Cancer      Cervical Cancer Screening:    General: Screening Not Indicated      Osteoporosis Screening:    General: Risks and Benefits Discussed    Due for: DXA Axial      Abdominal Aortic Aneurysm (AAA) Screening:        General: Screening Not Indicated      Lung Cancer Screening:     General: Screening Not Indicated      Hepatitis C Screening:    General: Patient Declines    Hep C Screening Accepted: No     Screening, Brief Intervention, and Referral to Treatment (SBIRT)     Screening    Typical number of drinks in a week: 2    Single Item Drug Screening:  How often have you used an illegal drug (including marijuana) or a prescription medication for non-medical reasons in the past year? never    Single Item Drug Screen Score: 0  Interpretation: Negative screen for possible drug use  "disorder    Brief Intervention  Alcohol & drug use screenings were reviewed. No concerns regarding substance use disorder identified.     Other Counseling Topics:   Skin self-exam, sunscreen and calcium and vitamin D intake and regular weightbearing exercise.     Social Drivers of Health     Financial Resource Strain: Low Risk  (5/30/2024)    [facility]    Overall Financial Resource Strain (CARDIA)    • Difficulty of Paying Living Expenses: Not hard at all   Food Insecurity: No Food Insecurity (3/31/2025)    Hunger Vital Sign    • Worried About Running Out of Food in the Last Year: Never true    • Ran Out of Food in the Last Year: Never true   Transportation Needs: No Transportation Needs (3/31/2025)    PRAPARE - Transportation    • Lack of Transportation (Medical): No    • Lack of Transportation (Non-Medical): No   Housing Stability: Low Risk  (3/31/2025)    Housing Stability Vital Sign    • Unable to Pay for Housing in the Last Year: No    • Number of Times Moved in the Last Year: 1    • Homeless in the Last Year: No   Utilities: Not At Risk (3/31/2025)    [facility] Utilities    • Threatened with loss of utilities: No     No results found.    Objective   /80 (BP Location: Left arm, Patient Position: Sitting, Cuff Size: Standard)   Pulse 74   Temp 99 °F (37.2 °C) (Tympanic)   Resp 15   Ht 5' 5\" (1.651 m)   Wt 78.5 kg (173 lb)   SpO2 95%   BMI 28.79 kg/m²     Physical Exam  Vitals and nursing note reviewed.   Constitutional:       General: She is not in acute distress.     Appearance: Normal appearance. She is not ill-appearing, toxic-appearing or diaphoretic.   HENT:      Head: Normocephalic and atraumatic.      Right Ear: Tympanic membrane, ear canal and external ear normal. There is no impacted cerumen.      Left Ear: Tympanic membrane, ear canal and external ear normal. There is no impacted cerumen.      Nose: Nose normal. No congestion or rhinorrhea.      Mouth/Throat:      Mouth: " Mucous membranes are moist.      Pharynx: Oropharynx is clear. No oropharyngeal exudate or posterior oropharyngeal erythema.   Eyes:      General: No scleral icterus.        Right eye: No discharge.         Left eye: No discharge.      Extraocular Movements: Extraocular movements intact.      Conjunctiva/sclera: Conjunctivae normal.      Pupils: Pupils are equal, round, and reactive to light.   Neck:      Vascular: No carotid bruit.   Cardiovascular:      Rate and Rhythm: Normal rate and regular rhythm.      Pulses: Normal pulses.      Heart sounds: Normal heart sounds. No murmur heard.     No friction rub. No gallop.   Pulmonary:      Effort: Pulmonary effort is normal. No respiratory distress.      Breath sounds: Normal breath sounds. No stridor. No wheezing, rhonchi or rales.   Chest:      Chest wall: No tenderness.   Abdominal:      General: Bowel sounds are normal. There is no distension.      Palpations: Abdomen is soft. There is no mass.      Tenderness: There is no abdominal tenderness. There is no right CVA tenderness, left CVA tenderness, guarding or rebound.      Hernia: No hernia is present.   Musculoskeletal:         General: No swelling, tenderness, deformity or signs of injury. Normal range of motion.      Cervical back: Normal range of motion and neck supple. No rigidity or tenderness.      Right lower leg: No edema.      Left lower leg: No edema.   Feet:      Right foot:      Toenail Condition: Right toenails are abnormally thick. Fungal disease present.     Left foot:      Toenail Condition: Left toenails are abnormally thick. Fungal disease present.  Lymphadenopathy:      Cervical: No cervical adenopathy.   Skin:     General: Skin is warm.      Capillary Refill: Capillary refill takes less than 2 seconds.      Coloration: Skin is not jaundiced or pale.      Findings: No bruising, erythema, lesion or rash.   Neurological:      General: No focal deficit present.      Mental Status: She is alert and  oriented to person, place, and time.      Cranial Nerves: No cranial nerve deficit.      Sensory: No sensory deficit.      Motor: No weakness.      Coordination: Coordination normal.      Gait: Gait normal.      Deep Tendon Reflexes: Reflexes normal.   Psychiatric:         Mood and Affect: Mood normal.         Behavior: Behavior normal.         Thought Content: Thought content normal.         Judgment: Judgment normal.

## 2025-03-31 NOTE — ASSESSMENT & PLAN NOTE
Pt follows with psychiatrist continue buspirone 15 mg bid,   Orders:  •  Comprehensive metabolic panel

## 2025-04-01 ENCOUNTER — RESULTS FOLLOW-UP (OUTPATIENT)
Dept: FAMILY MEDICINE CLINIC | Facility: CLINIC | Age: 75
End: 2025-04-01

## 2025-04-01 DIAGNOSIS — E78.5 DYSLIPIDEMIA: Primary | ICD-10-CM

## 2025-04-01 DIAGNOSIS — N30.00 ACUTE CYSTITIS WITHOUT HEMATURIA: ICD-10-CM

## 2025-04-01 NOTE — TELEPHONE ENCOUNTER
----- Message from SHAWNEE Dial sent at 4/1/2025  8:09 AM EDT -----  Cholesterol level is  elevated recommend statin therapy and low fat diet. If agreeable would start on Rosuvastatin 10 mg daily. WBC elevated most likely due to urinary tract infection that is being treated.

## 2025-04-01 NOTE — TELEPHONE ENCOUNTER
Patient called back, relayed message from Foreign.  Patient understands message and is agreeable to starting Rosuvastatin 10 mg daily.      Confirmed [facility]    Thank you

## 2025-04-02 RX ORDER — ROSUVASTATIN CALCIUM 10 MG/1
10 TABLET, COATED ORAL DAILY
Qty: 100 TABLET | Refills: 1 | Status: SHIPPED | OUTPATIENT
Start: 2025-04-02

## 2025-04-03 LAB
BACTERIA UR CULT: ABNORMAL
BACTERIA UR CULT: ABNORMAL

## 2025-04-03 RX ORDER — NITROFURANTOIN 25; 75 MG/1; MG/1
100 CAPSULE ORAL 2 TIMES DAILY
Qty: 10 CAPSULE | Refills: 0 | Status: SHIPPED | OUTPATIENT
Start: 2025-04-03 | End: 2025-04-08

## 2025-04-03 NOTE — TELEPHONE ENCOUNTER
----- Message from SHAWNEE Dial sent at 4/3/2025  2:24 PM EDT -----  Please inform patient based on the bacteria growing in urine will need to  change antibiotics to nitrofurantoin 100 mg twice daily, discontinue Bactrim. New script sent to [facility] on [address]

## 2025-04-03 NOTE — TELEPHONE ENCOUNTER
Patients daughter called in to return a call from intake. Lorena was transferred to the intake department for further assistance.

## 2025-04-03 NOTE — TELEPHONE ENCOUNTER
"Behavioral Health Outpatient Intake Questions    Referred By   :  SHAWNEE Dubon       Please advise interviewee that they need to answer all questions truthfully to allow for best care, and any misrepresentations of information may affect their ability to be seen at this clinic   => Was this discussed? Yes     If Minor Child (under age 18)    Who is/are the legal guardian(s) of the child?     Is there a custody agreement?      If \"YES\"- Custody orders must be obtained prior to scheduling the first appointment  In addition, Consent to Treatment must be signed by all legal guardians prior to scheduling the first appointment    If \"NO\"- Consent to Treatment must be signed by all legal guardians prior to scheduling the first appointment    Behavioral Health Outpatient Intake History -     Presenting Problem (in patient's own words): bipolar depression    Are there any communication barriers for this patient?     No                                               If yes, please describe barriers:   If there is a unique situation, please refer to Willi Lee/Marley Jones for final determination.    Are you taking any psychiatric medications? Yes     If \"YES\" -What are they Prozac, Trazodone, Lamictal, Geodon, and Buspar     If \"YES\" -Who prescribes?   Tami: Dr. Omega Abreu ([facility] Psychiatrist)  Prozac, trazodone, buspar, lamictal: pcp    Has the Patient previously received outpatient Talk Therapy or Medication Management from [facility]        If \"YES\"- When, Where and with Whom?         If \"NO\" -Has Patient received these services elsewhere?       If \"YES\" -When, Where, and with Whom?  [facility]     Has the Patient abused alcohol or other substances in the last 6 months ? No  No concerns of substance abuse are reported.     If \"YES\" -What substance, How much, How often?  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 About 1 yr sober at time of scheduling     If illegal substance: Refer to " "[facility] (for LESLIE) or [facility] Offices.   If Alcohol in excess of 10 drinks per week:  Refer to [facility] (for LESLIE) or [facility] Offices    Legal History-     Is this treatment court ordered? No   If \"yes \"send to :  Talk Therapy : Send to Willi Lee for final determination   Med Management: Send to Dr. Vera for final determination     Has the Patient been convicted of a felony?  No   If \"Yes\" send to -When, What?  Talk Therapy: Send to Willi Lee for final determination   Med Management: Send to Dr. Vera for final determination     ACCEPTED as a patient Yes  If \"Yes\" Appointment Date: 5/27 @ 9a roderick Sanchez    Referred Elsewhere? No  If “Yes” - (Where? Ex: [facility], [facility], [facility], [facility], [facility], etc.)       Name of Insurance Co:[employer]  Insurance ID#[id number]   Insurance Phone #  If ins is primary or secondary?  If patient is a minor, parents information such as Name, D.O.B of guarantor.  "

## 2025-04-10 ENCOUNTER — TELEPHONE (OUTPATIENT)
Dept: PSYCHIATRY | Facility: CLINIC | Age: 75
End: 2025-04-10

## 2025-04-10 NOTE — TELEPHONE ENCOUNTER
One week follow up call for New Patient appointment with   SHAWNEE Clarke   on 05/27/2025 was made on 04/10/2025. Writer informed patient of New Patient paperwork needing to be completed 5 days prior to the appointment. Writer confirmed paperwork has been sent via [employer].    Appointment was made on: 04/03/2025

## 2025-04-22 DIAGNOSIS — M47.812 CERVICAL SPONDYLOSIS: ICD-10-CM

## 2025-04-22 DIAGNOSIS — F41.9 ANXIETY: ICD-10-CM

## 2025-04-24 RX ORDER — CYCLOBENZAPRINE HCL 10 MG
10 TABLET ORAL 2 TIMES DAILY
Qty: 60 TABLET | Refills: 0 | Status: ON HOLD | OUTPATIENT
Start: 2025-04-24

## 2025-04-24 RX ORDER — BUSPIRONE HYDROCHLORIDE 15 MG/1
15 TABLET ORAL 2 TIMES DAILY
Qty: 200 TABLET | Refills: 1 | Status: ON HOLD | OUTPATIENT
Start: 2025-04-24

## 2025-04-29 ENCOUNTER — TELEPHONE (OUTPATIENT)
Age: 75
End: 2025-04-29

## 2025-04-29 ENCOUNTER — NURSE TRIAGE (OUTPATIENT)
Age: 75
End: 2025-04-29

## 2025-04-29 DIAGNOSIS — N39.0 RECURRENT UTI: Primary | ICD-10-CM

## 2025-04-29 RX ORDER — NITROFURANTOIN 25; 75 MG/1; MG/1
100 CAPSULE ORAL 2 TIMES DAILY
Qty: 14 CAPSULE | Refills: 0 | Status: ON HOLD | OUTPATIENT
Start: 2025-04-29 | End: 2025-05-06

## 2025-04-29 NOTE — TELEPHONE ENCOUNTER
Regarding: Temp 99 and symptoms getting worse from appt  ----- Message from Nicole SHEPPARD sent at 4/29/2025  9:38 AM EDT -----  Pt wanted to give message to Foreign Powell.  She said she's been getting treated for a UTI hasn't gone away with the two different antibiotics.  Her symptoms are getting worse and temp is 99 which it's normally 97.6.  It's hard for her to get to appts maybe she could call something else in for her she said.    I did send it to office clinical but do you think Nurse too should call her since said UTI symptoms getting worse.

## 2025-04-29 NOTE — TELEPHONE ENCOUNTER
Based on urine culture Nitrofurantoin is the oral antibiotic that would be effective against the organism found in the urine. I  will treat for another 7 days. If symptoms persist or worsen would recommend going to ER would require IV antibiotics.

## 2025-04-29 NOTE — TELEPHONE ENCOUNTER
Called pt and advised to try new antibiotic for 7 days and to go to ER for IV antibiotics. Pt says if this one doesn't work she is a bit nervous about getting C diff again after using so many antibiotics.     Pt is concerned of high blood pressure. She took BP at home and took it on Saturday and it was high. 143/88. Pt would like to know if she should be seen.

## 2025-04-29 NOTE — TELEPHONE ENCOUNTER
Called pt and left a vm advising a call back for her to schedule he appt in regards to her BP being high.

## 2025-04-29 NOTE — TELEPHONE ENCOUNTER
Pt wanted to give message to Foreign Powell.  She said she's been getting treated for a UTI hasn't gone away with the two different antibiotics.  Her symptoms are getting worse and temp is 99 which it's normally 97.6.  It's hard for her to get to appts maybe she could call something else in for her she said.

## 2025-04-29 NOTE — TELEPHONE ENCOUNTER
"FOLLOW UP: Please review with PCP and further advise Pt on continued UTI symptoms s/p treatment.    REASON FOR CONVERSATION: Urinary Frequency    SYMPTOMS: See below.    OTHER: Reviewed water is best. Avoid citrus or spicy foods/beverages as they can further irritate the bladder. Pt verbalized understanding. Pt unable to take AZO as it causes vomiting.    DISPOSITION: Discuss With PCP and Callback by Nurse Today      Answer Assessment - Initial Assessment Questions  1. SYMPTOM: \"What's the main symptom you're concerned about?\" (e.g., frequency, incontinence)      Pelvic pressure, low back discomfort, urgency, frequency, pain with urination, and low grade temp 99F this morning.    2. ONSET: \"When did the  symptoms  start?\"      Pt was dx with UTI and initially prescribed Bactrim DS 3/31 then nitrofurantoin 4/3 after culture results. Pt completed antibiotic and felt better for a few days. Symptoms returned.    3. OTHER SYMPTOMS: \"Do you have any other symptoms?\" (e.g., blood in urine, fever, flank pain, pain with urination)      Denies blood in urine. Urine is cloudy. Pt is able to fully empty bladder,    Protocols used: Urinary Symptoms-Adult-[place]    "

## 2025-05-03 ENCOUNTER — APPOINTMENT (EMERGENCY)
Dept: RADIOLOGY | Facility: HOSPITAL | Age: 75
DRG: 690 | End: 2025-05-03
Payer: COMMERCIAL

## 2025-05-03 ENCOUNTER — HOSPITAL ENCOUNTER (INPATIENT)
Facility: HOSPITAL | Age: 75
LOS: 3 days | Discharge: HOME | DRG: 690 | End: 2025-05-06
Attending: EMERGENCY MEDICINE | Admitting: INTERNAL MEDICINE
Payer: COMMERCIAL

## 2025-05-03 DIAGNOSIS — A49.9 ESBL (EXTENDED SPECTRUM BETA-LACTAMASE) PRODUCING BACTERIA INFECTION: Primary | ICD-10-CM

## 2025-05-03 DIAGNOSIS — Z16.12 ESBL (EXTENDED SPECTRUM BETA-LACTAMASE) PRODUCING BACTERIA INFECTION: Primary | ICD-10-CM

## 2025-05-03 DIAGNOSIS — R30.0 DYSURIA: ICD-10-CM

## 2025-05-03 DIAGNOSIS — N30.90 CYSTITIS: ICD-10-CM

## 2025-05-03 DIAGNOSIS — N39.0 UTI (URINARY TRACT INFECTION): ICD-10-CM

## 2025-05-03 PROBLEM — B96.29 UTI DUE TO EXTENDED-SPECTRUM BETA LACTAMASE (ESBL) PRODUCING ESCHERICHIA COLI: Status: ACTIVE | Noted: 2024-09-05

## 2025-05-03 PROBLEM — D72.829 LEUKOCYTOSIS: Status: ACTIVE | Noted: 2025-05-03

## 2025-05-03 PROBLEM — J44.9 COPD (CHRONIC OBSTRUCTIVE PULMONARY DISEASE) (HCC): Status: ACTIVE | Noted: 2024-09-05

## 2025-05-03 LAB
ALBUMIN SERPL BCG-MCNC: 4.4 G/DL (ref 3.5–5)
ALP SERPL-CCNC: 80 U/L (ref 34–104)
ALT SERPL W P-5'-P-CCNC: 20 U/L (ref 7–52)
ANION GAP SERPL CALCULATED.3IONS-SCNC: 10 MMOL/L (ref 4–13)
AST SERPL W P-5'-P-CCNC: 19 U/L (ref 13–39)
BACTERIA UR QL AUTO: ABNORMAL /HPF
BASOPHILS # BLD AUTO: 0.07 THOUSANDS/ÂΜL (ref 0–0.1)
BASOPHILS NFR BLD AUTO: 1 % (ref 0–1)
BILIRUB SERPL-MCNC: 0.59 MG/DL (ref 0.2–1)
BILIRUB UR QL STRIP: NEGATIVE
BUN SERPL-MCNC: 15 MG/DL (ref 5–25)
CALCIUM SERPL-MCNC: 9.6 MG/DL (ref 8.4–10.2)
CHLORIDE SERPL-SCNC: 99 MMOL/L (ref 96–108)
CLARITY UR: ABNORMAL
CO2 SERPL-SCNC: 27 MMOL/L (ref 21–32)
COLOR UR: YELLOW
CREAT SERPL-MCNC: 0.71 MG/DL (ref 0.6–1.3)
EOSINOPHIL # BLD AUTO: 0.25 THOUSAND/ÂΜL (ref 0–0.61)
EOSINOPHIL NFR BLD AUTO: 2 % (ref 0–6)
ERYTHROCYTE [DISTWIDTH] IN BLOOD BY AUTOMATED COUNT: 13.3 % (ref 11.6–15.1)
GFR SERPL CREATININE-BSD FRML MDRD: 82 ML/MIN/1.73SQ M
GLUCOSE SERPL-MCNC: 115 MG/DL (ref 65–140)
GLUCOSE UR STRIP-MCNC: NEGATIVE MG/DL
HCT VFR BLD AUTO: 41.9 % (ref 34.8–46.1)
HGB BLD-MCNC: 14 G/DL (ref 11.5–15.4)
HGB UR QL STRIP.AUTO: ABNORMAL
IMM GRANULOCYTES # BLD AUTO: 0.15 THOUSAND/UL (ref 0–0.2)
IMM GRANULOCYTES NFR BLD AUTO: 1 % (ref 0–2)
KETONES UR STRIP-MCNC: NEGATIVE MG/DL
LEUKOCYTE ESTERASE UR QL STRIP: ABNORMAL
LYMPHOCYTES # BLD AUTO: 2.4 THOUSANDS/ÂΜL (ref 0.6–4.47)
LYMPHOCYTES NFR BLD AUTO: 16 % (ref 14–44)
MCH RBC QN AUTO: 32.6 PG (ref 26.8–34.3)
MCHC RBC AUTO-ENTMCNC: 33.4 G/DL (ref 31.4–37.4)
MCV RBC AUTO: 98 FL (ref 82–98)
MONOCYTES # BLD AUTO: 1.01 THOUSAND/ÂΜL (ref 0.17–1.22)
MONOCYTES NFR BLD AUTO: 7 % (ref 4–12)
NEUTROPHILS # BLD AUTO: 10.96 THOUSANDS/ÂΜL (ref 1.85–7.62)
NEUTS SEG NFR BLD AUTO: 73 % (ref 43–75)
NITRITE UR QL STRIP: NEGATIVE
NON-SQ EPI CELLS URNS QL MICRO: ABNORMAL /HPF
NRBC BLD AUTO-RTO: 0 /100 WBCS
PH UR STRIP.AUTO: 6 [PH]
PLATELET # BLD AUTO: 256 THOUSANDS/UL (ref 149–390)
PMV BLD AUTO: 9.5 FL (ref 8.9–12.7)
POTASSIUM SERPL-SCNC: 4.2 MMOL/L (ref 3.5–5.3)
PROT SERPL-MCNC: 7.3 G/DL (ref 6.4–8.4)
PROT UR STRIP-MCNC: ABNORMAL MG/DL
RBC # BLD AUTO: 4.29 MILLION/UL (ref 3.81–5.12)
RBC #/AREA URNS AUTO: ABNORMAL /HPF
SODIUM SERPL-SCNC: 136 MMOL/L (ref 135–147)
SP GR UR STRIP.AUTO: 1.01 (ref 1–1.03)
UROBILINOGEN UR STRIP-ACNC: <2 MG/DL
WBC # BLD AUTO: 14.84 THOUSAND/UL (ref 4.31–10.16)
WBC #/AREA URNS AUTO: ABNORMAL /HPF
WBC CLUMPS # UR AUTO: PRESENT /UL

## 2025-05-03 PROCEDURE — 87086 URINE CULTURE/COLONY COUNT: CPT

## 2025-05-03 PROCEDURE — 96375 TX/PRO/DX INJ NEW DRUG ADDON: CPT

## 2025-05-03 PROCEDURE — 99284 EMERGENCY DEPT VISIT MOD MDM: CPT

## 2025-05-03 PROCEDURE — 96374 THER/PROPH/DIAG INJ IV PUSH: CPT

## 2025-05-03 PROCEDURE — 36415 COLL VENOUS BLD VENIPUNCTURE: CPT

## 2025-05-03 PROCEDURE — 87077 CULTURE AEROBIC IDENTIFY: CPT

## 2025-05-03 PROCEDURE — 99223 1ST HOSP IP/OBS HIGH 75: CPT | Performed by: INTERNAL MEDICINE

## 2025-05-03 PROCEDURE — 85025 COMPLETE CBC W/AUTO DIFF WBC: CPT

## 2025-05-03 PROCEDURE — 81001 URINALYSIS AUTO W/SCOPE: CPT

## 2025-05-03 PROCEDURE — 80053 COMPREHEN METABOLIC PANEL: CPT

## 2025-05-03 PROCEDURE — 99285 EMERGENCY DEPT VISIT HI MDM: CPT | Performed by: EMERGENCY MEDICINE

## 2025-05-03 PROCEDURE — 74177 CT ABD & PELVIS W/CONTRAST: CPT

## 2025-05-03 PROCEDURE — 87186 SC STD MICRODIL/AGAR DIL: CPT

## 2025-05-03 RX ORDER — ALBUTEROL SULFATE 90 UG/1
2 INHALANT RESPIRATORY (INHALATION) EVERY 6 HOURS PRN
Status: DISCONTINUED | OUTPATIENT
Start: 2025-05-03 | End: 2025-05-06 | Stop reason: HOSPADM

## 2025-05-03 RX ORDER — ACETAMINOPHEN 325 MG/1
650 TABLET ORAL EVERY 6 HOURS PRN
Status: DISCONTINUED | OUTPATIENT
Start: 2025-05-03 | End: 2025-05-05

## 2025-05-03 RX ORDER — OXYCODONE HYDROCHLORIDE 5 MG/1
5 TABLET ORAL EVERY 4 HOURS PRN
Refills: 0 | Status: DISCONTINUED | OUTPATIENT
Start: 2025-05-03 | End: 2025-05-04

## 2025-05-03 RX ORDER — OXYCODONE HYDROCHLORIDE 5 MG/1
5 TABLET ORAL EVERY 6 HOURS PRN
Refills: 0 | Status: DISCONTINUED | OUTPATIENT
Start: 2025-05-03 | End: 2025-05-03

## 2025-05-03 RX ORDER — PRAVASTATIN SODIUM 80 MG/1
80 TABLET ORAL
Status: DISCONTINUED | OUTPATIENT
Start: 2025-05-04 | End: 2025-05-06 | Stop reason: HOSPADM

## 2025-05-03 RX ORDER — ENOXAPARIN SODIUM 100 MG/ML
40 INJECTION SUBCUTANEOUS DAILY
Status: DISCONTINUED | OUTPATIENT
Start: 2025-05-04 | End: 2025-05-06 | Stop reason: HOSPADM

## 2025-05-03 RX ORDER — ZIPRASIDONE HYDROCHLORIDE 20 MG/1
20 CAPSULE ORAL DAILY
Status: DISCONTINUED | OUTPATIENT
Start: 2025-05-04 | End: 2025-05-03

## 2025-05-03 RX ORDER — HYDROMORPHONE HCL IN WATER/PF 6 MG/30 ML
0.2 PATIENT CONTROLLED ANALGESIA SYRINGE INTRAVENOUS ONCE
Status: COMPLETED | OUTPATIENT
Start: 2025-05-03 | End: 2025-05-03

## 2025-05-03 RX ORDER — HYDROMORPHONE HCL/PF 1 MG/ML
0.5 SYRINGE (ML) INJECTION ONCE
Refills: 0 | Status: COMPLETED | OUTPATIENT
Start: 2025-05-03 | End: 2025-05-03

## 2025-05-03 RX ORDER — HYDROMORPHONE HCL/PF 1 MG/ML
0.5 SYRINGE (ML) INJECTION EVERY 6 HOURS PRN
Status: DISCONTINUED | OUTPATIENT
Start: 2025-05-03 | End: 2025-05-03

## 2025-05-03 RX ORDER — ZIPRASIDONE HYDROCHLORIDE 20 MG/1
20 CAPSULE ORAL
Status: DISCONTINUED | OUTPATIENT
Start: 2025-05-04 | End: 2025-05-06 | Stop reason: HOSPADM

## 2025-05-03 RX ORDER — POLYETHYLENE GLYCOL 3350 17 G/17G
17 POWDER, FOR SOLUTION ORAL DAILY PRN
Status: DISCONTINUED | OUTPATIENT
Start: 2025-05-03 | End: 2025-05-06 | Stop reason: HOSPADM

## 2025-05-03 RX ORDER — ONDANSETRON 2 MG/ML
4 INJECTION INTRAMUSCULAR; INTRAVENOUS ONCE
Status: COMPLETED | OUTPATIENT
Start: 2025-05-03 | End: 2025-05-03

## 2025-05-03 RX ORDER — CYCLOBENZAPRINE HCL 10 MG
10 TABLET ORAL 2 TIMES DAILY
Status: DISCONTINUED | OUTPATIENT
Start: 2025-05-03 | End: 2025-05-06 | Stop reason: HOSPADM

## 2025-05-03 RX ORDER — ONDANSETRON 2 MG/ML
INJECTION INTRAMUSCULAR; INTRAVENOUS
Status: COMPLETED
Start: 2025-05-03 | End: 2025-05-03

## 2025-05-03 RX ORDER — HYDROMORPHONE HCL/PF 1 MG/ML
0.5 SYRINGE (ML) INJECTION EVERY 4 HOURS PRN
Status: DISCONTINUED | OUTPATIENT
Start: 2025-05-03 | End: 2025-05-05

## 2025-05-03 RX ORDER — KETOROLAC TROMETHAMINE 30 MG/ML
15 INJECTION, SOLUTION INTRAMUSCULAR; INTRAVENOUS ONCE
Status: COMPLETED | OUTPATIENT
Start: 2025-05-03 | End: 2025-05-03

## 2025-05-03 RX ORDER — LAMOTRIGINE 100 MG/1
200 TABLET ORAL 2 TIMES DAILY
Status: DISCONTINUED | OUTPATIENT
Start: 2025-05-03 | End: 2025-05-06 | Stop reason: HOSPADM

## 2025-05-03 RX ORDER — IPRATROPIUM BROMIDE AND ALBUTEROL SULFATE 2.5; .5 MG/3ML; MG/3ML
3 SOLUTION RESPIRATORY (INHALATION) EVERY 8 HOURS PRN
Status: DISCONTINUED | OUTPATIENT
Start: 2025-05-03 | End: 2025-05-03

## 2025-05-03 RX ADMIN — HYDROMORPHONE HYDROCHLORIDE 0.5 MG: 1 INJECTION, SOLUTION INTRAMUSCULAR; INTRAVENOUS; SUBCUTANEOUS at 23:08

## 2025-05-03 RX ADMIN — BUSPIRONE HYDROCHLORIDE 15 MG: 5 TABLET ORAL at 22:14

## 2025-05-03 RX ADMIN — HYDROMORPHONE HYDROCHLORIDE 0.5 MG: 1 INJECTION, SOLUTION INTRAMUSCULAR; INTRAVENOUS; SUBCUTANEOUS at 17:10

## 2025-05-03 RX ADMIN — ONDANSETRON 4 MG: 2 INJECTION INTRAMUSCULAR; INTRAVENOUS at 10:11

## 2025-05-03 RX ADMIN — LAMOTRIGINE 200 MG: 100 TABLET ORAL at 17:10

## 2025-05-03 RX ADMIN — HYDROMORPHONE HYDROCHLORIDE 0.2 MG: 0.2 INJECTION, SOLUTION INTRAMUSCULAR; INTRAVENOUS; SUBCUTANEOUS at 10:11

## 2025-05-03 RX ADMIN — TRAZODONE HYDROCHLORIDE 150 MG: 50 TABLET ORAL at 22:14

## 2025-05-03 RX ADMIN — PIPERACILLIN AND TAZOBACTAM 4.5 G: 36; 4.5 INJECTION, POWDER, FOR SOLUTION INTRAVENOUS at 17:10

## 2025-05-03 RX ADMIN — PIPERACILLIN AND TAZOBACTAM 4.5 G: 36; 4.5 INJECTION, POWDER, FOR SOLUTION INTRAVENOUS at 23:07

## 2025-05-03 RX ADMIN — IOHEXOL 80 ML: 350 INJECTION, SOLUTION INTRAVENOUS at 10:31

## 2025-05-03 RX ADMIN — OXYCODONE HYDROCHLORIDE 5 MG: 5 TABLET ORAL at 22:14

## 2025-05-03 RX ADMIN — CYCLOBENZAPRINE HYDROCHLORIDE 10 MG: 10 TABLET, FILM COATED ORAL at 17:09

## 2025-05-03 RX ADMIN — KETOROLAC TROMETHAMINE 15 MG: 30 INJECTION, SOLUTION INTRAMUSCULAR; INTRAVENOUS at 13:33

## 2025-05-03 RX ADMIN — HYDROMORPHONE HYDROCHLORIDE 0.5 MG: 1 INJECTION, SOLUTION INTRAMUSCULAR; INTRAVENOUS; SUBCUTANEOUS at 11:15

## 2025-05-03 RX ADMIN — PIPERACILLIN AND TAZOBACTAM 4.5 G: 36; 4.5 INJECTION, POWDER, FOR SOLUTION INTRAVENOUS at 11:57

## 2025-05-03 RX ADMIN — OXYCODONE HYDROCHLORIDE 5 MG: 5 TABLET ORAL at 14:43

## 2025-05-03 RX ADMIN — ONDANSETRON 4 MG: 2 INJECTION, SOLUTION INTRAMUSCULAR; INTRAVENOUS at 10:11

## 2025-05-03 NOTE — RESPIRATORY THERAPY NOTE
RT Protocol Note  Carola Wilkes 75 y.o. female MRN: [id number]  Unit/Bed#: -01 Encounter: [id number]    Assessment    Principal Problem:    UTI due to extended-spectrum beta lactamase (ESBL) producing Escherichia coli  Active Problems:    Bipolar disorder, current episode depressed, severe, without psychotic features (HCC)    COPD (chronic obstructive pulmonary disease) (HCC)    Leukocytosis      Home Pulmonary Medications:    Home Devices/Therapy: Other (Comment) (albuterol mdi and udn prn)    Past Medical History:   Diagnosis Date    Psychiatric disorder      Social History     Socioeconomic History    Marital status:      Spouse name: None    Number of children: None    Years of education: None    Highest education level: None   Occupational History    None   Tobacco Use    Smoking status: Some Days     Average packs/day: 0.3 packs/day for 19.0 years (4.8 ttl pk-yrs)     Types: Cigarettes     Start date: 2005    Smokeless tobacco: None   Vaping Use    Vaping status: Some Days    Substances: THC   Substance and Sexual Activity    Alcohol use: Not Currently    Drug use: Never    Sexual activity: None   Other Topics Concern    None   Social History Narrative    None     Social Drivers of Health     Financial Resource Strain: Low Risk  (5/30/2024)    Received from [facility]    Overall Financial Resource Strain (CARDIA)     Difficulty of Paying Living Expenses: Not hard at all   Food Insecurity: No Food Insecurity (3/31/2025)    Hunger Vital Sign     Worried About Running Out of Food in the Last Year: Never true     Ran Out of Food in the Last Year: Never true   Transportation Needs: No Transportation Needs (3/31/2025)    PRAPARE - Transportation     Lack of Transportation (Medical): No     Lack of Transportation (Non-Medical): No   Physical Activity: Sufficiently Active (5/30/2024)    Received from [facility]    Exercise Vital Sign     Days of Exercise per Week: 7 days     Minutes of  "Exercise per Session: 30 min   Stress: No Stress Concern Present (5/30/2024)    Received from [facility]    [employer] - Occupational Stress Questionnaire     Feeling of Stress : Not at all   Social Connections: Moderately Integrated (5/30/2024)    Received from [facility]    Social Connection and Isolation Panel [NHANES]     Frequency of Communication with Friends and Family: More than three times a week     Frequency of Social Gatherings with Friends and Family: More than three times a week     Attends Christianity Services: More than 4 times per year     Active Member of Clubs or Organizations: Yes     Attends Club or Organization Meetings: More than 4 times per year     Marital Status:    Intimate Partner Violence: Not At Risk (5/30/2024)    Received from [facility]    Humiliation, Afraid, Rape, and Kick questionnaire     Fear of Current or Ex-Partner: No     Emotionally Abused: No     Physically Abused: No     Sexually Abused: No   Housing Stability: Low Risk  (3/31/2025)    Housing Stability Vital Sign     Unable to Pay for Housing in the Last Year: No     Number of Times Moved in the Last Year: 1     Homeless in the Last Year: No       Subjective         Objective    Physical Exam:   Assessment Type: Assess only  General Appearance: Alert, Awake  Respiratory Pattern: Normal  Chest Assessment: Chest expansion symmetrical  Bilateral Breath Sounds: Diminished, Clear    Vitals:  Blood pressure 105/61, pulse 72, temperature 97.9 °F (36.6 °C), temperature source Oral, resp. rate 16, height 5' 5\" (1.651 m), weight 79.6 kg (175 lb 7.8 oz), SpO2 91%.          Imaging and other studies: Results Review Statement: No pertinent imaging studies reviewed.          Plan    Respiratory Plan: Home Bronchodilator Patient pathway        Resp Comments: (P) Pt. evaluated per respiratoy protocol.  Pt admit with UTI.  She has a history of COPD currently not in exacerbation.  Pt " states she has an albuterol mdi/udn prn at home but she very rarely uses, if anything she uses the MDI.  She is resting comfortably on RA experiencing no dyspnea or distress.  No wheezing noted.  Will continue albuterol prn per patient home therapy.  No further respiratory intervention required at this time.

## 2025-05-03 NOTE — ED PROVIDER NOTES
Time reflects when diagnosis was documented in both MDM as applicable and the Disposition within this note       Time User Action Codes Description Comment    5/3/2025 11:40 AM Juliet Tee Add [A49.9,  Z16.12] ESBL (extended spectrum beta-lactamase) producing bacteria infection     5/3/2025 11:40 AM TarynflJane yousifflaco Emmanuel [N30.90] Cystitis     5/3/2025 11:41 AM Juliet Tee Add [N39.0] UTI (urinary tract infection)     5/3/2025 11:41 AM Juliet Tee Add [R30.0] Dysuria           ED Disposition       ED Disposition   Admit    Condition   Stable    Date/Time   Sat May 3, 2025 11:40 AM    Comment   Case was discussed with KIANA and the patient's admission status was agreed to be Admission Status: inpatient status.               Assessment & Plan       Medical Decision Making  Patient is a 75 y.o. female  who presents to the ED with continued UTI symptoms including dysuria, abdominal and back pain, new incontinence; known ESBL.    Vital signs stable. Exam as listed below.    History and physical exam are most consistent with known ESBL UTI. Concern for pyelonephritis vs nephro/ureterolithiasis.    Plan   CBC to evaluate for anemia, evaluate for leukocytosis as sign of infectious source of symptoms.  CMP to evaluate for electrolyte derangement, assess renal and hepatic function.  UA, culture for known ESBL infection  CT abd/pelvis to evaluate for intraabdominal pathology as source of pain, specifically pyelonephritis, and rule out SBO, constipation, look for possible mass/malignancy, assess for signs of inflammation.    View ED course below for further discussion on patient workup.     On review of previous records patient was diagnosed with ESBL on 4/3, treated with 2 courses of Macrobid.    All labs reviewed and utilized in the medical decision making process  All radiology studies independently viewed by me and interpreted by the radiologist.  I reviewed all testing with the patient.     Upon re-evaluation  "patient admitted to [facility] for IV antibiotic treatment of suspected ESBL. Patient stable at time of admission.     Portions of the record may have been created with voice recognition software. Occasional wrong word or \"sound a like\" substitutions may have occurred due to the inherent limitations of voice recognition software. Read the chart carefully and recognize, using context, where substitutions have occurred.       Amount and/or Complexity of Data Reviewed  Labs: ordered. Decision-making details documented in ED Course.  Radiology: ordered. Decision-making details documented in ED Course.    Risk  Prescription drug management.  Decision regarding hospitalization.        ED Course as of 05/03/25 1141   Sat May 03, 2025   0958 WBC, UA(!): Innumerable   0958 WBC(!): 14.84   1134 CT abdomen pelvis with contrast  Bilateral renal cortical scarring right greater than left. Nonobstructing right renal calculus. No hydronephrosis or obstructive uropathy. No CT evidence for pyelonephritis.  Minimal bladder wall thickening. Cystitis not excluded.       Medications   piperacillin-tazobactam (ZOSYN) 4.5 g in sodium chloride 0.9 % 100 mL IVPB (has no administration in time range)   piperacillin-tazobactam (ZOSYN) 4.5 g in sodium chloride 0.9 % 100 mL IVPB (EXTENDED INFUSION) (has no administration in time range)   HYDROmorphone HCl (DILAUDID) injection 0.2 mg (0.2 mg Intravenous Given 5/3/25 1011)   ondansetron (ZOFRAN) injection 4 mg (4 mg Intravenous Given 5/3/25 1011)   iohexol (OMNIPAQUE) 350 MG/ML injection (MULTI-DOSE) 80 mL (80 mL Intravenous Given 5/3/25 1031)   HYDROmorphone (DILAUDID) injection 0.5 mg (0.5 mg Intravenous Given 5/3/25 1115)       ED Risk Strat Scores                    (ISAR) Identification of Seniors at Risk  Before the illness or injury that brought you to the Emergency, did you need someone to help you on a regular basis?: 0  In the last 24 hours, have you needed more help than usual?: 0  Have you " been hospitalized for one or more nights during the past 6 months?: 0  In general, do you see well?: 0  In general, do you have serious problems with your memory?: 0  Do you take more than three different medications every day?: 0  ISAR Score: 0            SBIRT 22yo+      Flowsheet Row Most Recent Value   Initial Alcohol Screen: US AUDIT-C     1. How often do you have a drink containing alcohol? 0 Filed at: 05/03/2025 0840   2. How many drinks containing alcohol do you have on a typical day you are drinking?  0 Filed at: 05/03/2025 0840   3a. Male UNDER 65: How often do you have five or more drinks on one occasion? 0 Filed at: 05/03/2025 0840   3b. FEMALE Any Age, or MALE 65+: How often do you have 4 or more drinks on one occassion? 0 Filed at: 05/03/2025 0840   Audit-C Score 0 Filed at: 05/03/2025 0840   TAI: How many times in the past year have you...    Used an illegal drug or used a prescription medication for non-medical reasons? Never Filed at: 05/03/2025 0840                            History of Present Illness       Chief Complaint   Patient presents with    Possible UTI     Pt reports having recurrent UTI for a month.  Pt states she been on 3 different abx for it.       Past Medical History:   Diagnosis Date    Psychiatric disorder       History reviewed. No pertinent surgical history.   History reviewed. No pertinent family history.   Social History     Tobacco Use    Smoking status: Some Days     Average packs/day: 0.3 packs/day for 19.0 years (4.8 ttl pk-yrs)     Types: Cigarettes     Start date: 2005   Vaping Use    Vaping status: Some Days    Substances: THC   Substance Use Topics    Alcohol use: Not Currently    Drug use: Never      E-Cigarette/Vaping    E-Cigarette Use Current Some Day User       E-Cigarette/Vaping Substances    Nicotine No     THC Yes     CBD No     Flavoring No     Other No     Unknown No       I have reviewed and agree with the history as documented.     Patient is a  75-year-old female, past medical history COPD, schizoaffective disorder/bipolar disorder, anxiety, frequent UTIs and history of multiple admissions for pyelonephritis, presenting today with UTI symptoms.  Patient had a urine culture done at the end of March that was positive for ESBL. She completed 2 courses of macrobid. Her symptoms never resolved and have continued to worsen. She continues to have dysuria, cloudy urine, lower abdominal pressure, back pain, and now has new incontinence. She has not noticed hematuria. She has had about 3 days with fever over the past month where she also had chills, has not had any over the last few days. Feels overall weak and rundown. No changes in PO intake, drinking about 6 bottles of water per day. No chest pain, shortness of breath, vomiting, diarrhea. Has had some episodes of nausea.      History provided by:  Patient, relative and medical records      Review of Systems        Objective       ED Triage Vitals [05/03/25 0836]   Temperature Pulse Blood Pressure Respirations SpO2 Patient Position - Orthostatic VS   97.5 °F (36.4 °C) 81 127/76 18 94 % Sitting      Temp Source Heart Rate Source BP Location FiO2 (%) Pain Score    Oral Monitor Left arm -- 7      Vitals      Date and Time Temp Pulse SpO2 Resp BP Pain Score FACES Pain Rating User   05/03/25 1015 -- 70 93 % 18 -- -- --    05/03/25 1011 -- -- -- -- -- 6 --    05/03/25 1000 -- 70 95 % 20 129/111 -- --    05/03/25 0836 97.5 °F (36.4 °C) 81 94 % 18 127/76 7 -- KRR            Physical Exam  Vitals and nursing note reviewed.   Constitutional:       General: She is not in acute distress.     Appearance: She is well-developed. She is not ill-appearing or diaphoretic.   HENT:      Head: Normocephalic and atraumatic.      Nose: Nose normal.      Mouth/Throat:      Mouth: Mucous membranes are moist.   Eyes:      Conjunctiva/sclera: Conjunctivae normal.   Cardiovascular:      Rate and Rhythm: Normal rate and regular rhythm.       Heart sounds: No murmur heard.  Pulmonary:      Effort: Pulmonary effort is normal. No respiratory distress.      Breath sounds: Normal breath sounds. No wheezing or rhonchi.   Abdominal:      General: There is no distension.      Palpations: Abdomen is soft.      Tenderness: There is generalized abdominal tenderness. There is left CVA tenderness. There is no right CVA tenderness.   Musculoskeletal:         General: No swelling.      Cervical back: Neck supple.      Right lower leg: No edema.      Left lower leg: No edema.   Skin:     General: Skin is warm and dry.      Capillary Refill: Capillary refill takes less than 2 seconds.      Coloration: Skin is not pale.   Neurological:      Mental Status: She is alert.   Psychiatric:         Mood and Affect: Mood normal.         Results Reviewed       Procedure Component Value Units Date/Time    Comprehensive metabolic panel [[id number]] Collected: 05/03/25 0927    Lab Status: Final result Specimen: Blood from Arm, Left Updated: 05/03/25 1001     Sodium 136 mmol/L      Potassium 4.2 mmol/L      Chloride 99 mmol/L      CO2 27 mmol/L      ANION GAP 10 mmol/L      BUN 15 mg/dL      Creatinine 0.71 mg/dL      Glucose 115 mg/dL      Calcium 9.6 mg/dL      AST 19 U/L      ALT 20 U/L      Alkaline Phosphatase 80 U/L      Total Protein 7.3 g/dL      Albumin 4.4 g/dL      Total Bilirubin 0.59 mg/dL      eGFR 82 ml/min/1.73sq m     Narrative:      National Kidney Disease Foundation guidelines for Chronic Kidney Disease (CKD):     Stage 1 with normal or high GFR (GFR > 90 mL/min/1.73 square meters)    Stage 2 Mild CKD (GFR = 60-89 mL/min/1.73 square meters)    Stage 3A Moderate CKD (GFR = 45-59 mL/min/1.73 square meters)    Stage 3B Moderate CKD (GFR = 30-44 mL/min/1.73 square meters)    Stage 4 Severe CKD (GFR = 15-29 mL/min/1.73 square meters)    Stage 5 End Stage CKD (GFR <15 mL/min/1.73 square meters)  Note: GFR calculation is accurate only with a steady state creatinine     Urine Microscopic [[id number]]  (Abnormal) Collected: 05/03/25 0855    Lab Status: Final result Specimen: Urine, Clean Catch Updated: 05/03/25 0952     RBC, UA 20-30 /hpf      WBC, UA Innumerable /hpf      Epithelial Cells None Seen /hpf      Bacteria, UA Occasional /hpf      WBC Clumps Present    Urine culture [[id number]] Collected: 05/03/25 0855    Lab Status: In process Specimen: Urine, Clean Catch Updated: 05/03/25 0952    CBC and differential [[id number]]  (Abnormal) Collected: 05/03/25 0927    Lab Status: Final result Specimen: Blood from Arm, Left Updated: 05/03/25 0941     WBC 14.84 Thousand/uL      RBC 4.29 Million/uL      Hemoglobin 14.0 g/dL      Hematocrit 41.9 %      MCV 98 fL      MCH 32.6 pg      MCHC 33.4 g/dL      RDW 13.3 %      MPV 9.5 fL      Platelets 256 Thousands/uL      nRBC 0 /100 WBCs      Segmented % 73 %      Immature Grans % 1 %      Lymphocytes % 16 %      Monocytes % 7 %      Eosinophils Relative 2 %      Basophils Relative 1 %      Absolute Neutrophils 10.96 Thousands/µL      Absolute Immature Grans 0.15 Thousand/uL      Absolute Lymphocytes 2.40 Thousands/µL      Absolute Monocytes 1.01 Thousand/µL      Eosinophils Absolute 0.25 Thousand/µL      Basophils Absolute 0.07 Thousands/µL     UA w Reflex to Microscopic w Reflex to Culture [[id number]]  (Abnormal) Collected: 05/03/25 0855    Lab Status: Final result Specimen: Urine, Clean Catch Updated: 05/03/25 0940     Color, UA Yellow     Clarity, UA Extra Turbid     Specific Gravity, UA 1.012     pH, UA 6.0     Leukocytes, UA Large     Nitrite, UA Negative     Protein, UA Trace mg/dl      Glucose, UA Negative mg/dl      Ketones, UA Negative mg/dl      Urobilinogen, UA <2.0 mg/dl      Bilirubin, UA Negative     Occult Blood, UA Small            CT abdomen pelvis with contrast   Final Interpretation by Clint Cameron MD (05/03 1125)      Bilateral renal cortical scarring right greater than left. Nonobstructing right renal calculus. No  hydronephrosis or obstructive uropathy. No CT evidence for pyelonephritis.      Minimal bladder wall thickening. Cystitis not excluded.      Additional findings as above.                  Resident: Gustavo Almonte I, the attending radiologist, have reviewed the images and agree with the final report above.      Workstation performed: [id number]             Procedures    ED Medication and Procedure Management   Prior to Admission Medications   Prescriptions Last Dose Informant Patient Reported? Taking?   Acetaminophen 500 MG   Yes No   Sig: Take 1,000 mg by mouth every 6 (six) hours as needed   Cholecalciferol 25 MCG (1000 UT) CHEW   Yes No   Sig: Chew 1,000 Units daily   FLUoxetine (PROzac) 40 MG capsule   No No   Sig: TAKE 1 CAPSULE BY MOUTH DAILY   Multiple Vitamin (MULTI VITAMIN DAILY PO)   Yes No   Sig: Take 1 tablet by mouth daily   Omega-3 Fatty Acids (Omega-3 Fish Oil) 1000 MG CAPS   Yes No   Si,000 mg daily   Vitamin E 400 units TABS   Yes No   Sig: Take 400 Units by mouth daily   albuterol (Ventolin HFA) 90 mcg/act inhaler   No No   Sig: Inhale 2 puffs every 6 (six) hours as needed for wheezing   beclomethasone (Qvar RediHaler) 80 MCG/ACT inhaler   No No   Sig: Inhale 2 puffs 2 (two) times a day Rinse mouth after use.   busPIRone (BUSPAR) 15 mg tablet   No No   Sig: TAKE 1 TABLET BY MOUTH TWICE  DAILY   cyclobenzaprine (FLEXERIL) 10 mg tablet   No No   Sig: TAKE 1 TABLET BY MOUTH TWICE  DAILY   ipratropium-albuterol (DUO-NEB) 0.5-2.5 mg/3 mL nebulizer solution   Yes No   Sig: Inhale 3 mL   lamoTRIgine (LaMICtal) 200 MG tablet   No No   Sig: TAKE 1 TABLET BY MOUTH TWICE  DAILY   nitrofurantoin (MACROBID) 100 mg capsule   No No   Sig: Take 1 capsule (100 mg total) by mouth 2 (two) times a day for 7 days   rosuvastatin (CRESTOR) 10 MG tablet   No No   Sig: Take 1 tablet (10 mg total) by mouth daily   traZODone (DESYREL) 150 mg tablet   No No   Sig: TAKE 2 TABLETS BY MOUTH DAILY AT BEDTIME    ziprasidone (GEODON) 20 mg capsule   Yes No   Sig: Take 20 mg by mouth daily      Facility-Administered Medications: None     Patient's Medications   Discharge Prescriptions    No medications on file     No discharge procedures on file.  ED SEPSIS DOCUMENTATION   Time reflects when diagnosis was documented in both MDM as applicable and the Disposition within this note       Time User Action Codes Description Comment    5/3/2025 11:40 AM Juliet Tee [A49.9,  Z16.12] ESBL (extended spectrum beta-lactamase) producing bacteria infection     5/3/2025 11:40 AM Juliet Tee [N30.90] Cystitis     5/3/2025 11:41 AM Juliet Tee [N39.0] UTI (urinary tract infection)     5/3/2025 11:41 AM Juliet Tee [R30.0] Dysuria                  Juliet Tee DO  05/03/25 1253

## 2025-05-03 NOTE — ASSESSMENT & PLAN NOTE
WBC of 14 in the setting of UTI.  Currently afebrile  If develops fever then obtain blood cultures x2  Antibiotics plan as outlined above  Monitor CBC

## 2025-05-03 NOTE — ASSESSMENT & PLAN NOTE
History of recurrent UTI presents with ongoing dysuria, suprapubic pressure and left flank pain.  He reported being placed on multiple regimen since [place] with Bactrim then Macrobid.  Urine cultures in March showing ESBL.    UA is showing pyuria with occasional bacteria and 20-30 RBC with positive leukocyte follow-up current urine cultures  CT abdomen showed bilateral renal cortical scarring with nonobstructive right renal calculus without hydronephrosis and without evidence of pyelonephritis but notable minimal bladder wall thickening.  ED team discussed with infectious disease who recommended starting IV Zosyn.  Formal consultation placed  As needed analgesics

## 2025-05-03 NOTE — ASSESSMENT & PLAN NOTE
Continue BuSpar 50 mg twice daily, Prozac 40 mg daily, Lamictal 200 mg twice daily and Geodon 20 mg qd

## 2025-05-03 NOTE — CASE MANAGEMENT
Case Management Assessment & Discharge Planning Note    Patient name Carola Wilkes  Location /-01 MRN [id number]  : 1950 Date 5/3/2025       Current Admission Date: 5/3/2025  Current Admission Diagnosis:UTI due to extended-spectrum beta lactamase (ESBL) producing Escherichia coli   Patient Active Problem List    Diagnosis Date Noted Date Diagnosed    Leukocytosis 2025     COPD (chronic obstructive pulmonary disease) (HCC) 2024     UTI due to extended-spectrum beta lactamase (ESBL) producing Escherichia coli 2024     Diarrhea 2024     Lesion of nose 2024     Bipolar disorder, current episode depressed, severe, without psychotic features ([facility]) 2024     Psychosis ([facility]) 2024     Suicide attempt ([facility]) 2024     Anxiety 2023     Cervical spondylosis 2023       LOS (days): 0  Geometric Mean LOS (GMLOS) (days):   Days to GMLOS:     OBJECTIVE:    Risk of Unplanned Readmission Score: 7.26         Current admission status: Inpatient       Preferred Pharmacy:   [facility] - [place] [address]  [address] [address]  Phone: [phone] Fax: [phone]    [id number] - [place], [place] - [address] [place] [id number]  Phone: [phone] Fax: [phone]    Primary Care Provider: SHAWNEE Dubon    Primary Insurance: [facility]  Secondary Insurance:     ASSESSMENT:  Active Health Care Proxies       Lester Paris Health Care Representative - Son In-Law   Primary Phone: [phone] (Home)                 Advance Directives  Does patient have a Health Care POA?: Yes  Does patient have Advance Directives?: Yes  Advance Directives: Living will, Power of  for health care  Primary Contact: AKILA Batista    Patient Information  Admitted from:: Home  Mental Status: Alert  During Assessment patient was  accompanied by: Not accompanied during assessment  Assessment information provided by:: Patient  Primary Caregiver: Self  Support Systems: Self, Spouse/significant other, Son, Daughter  What city do you live in?: [place]  Home entry access options. Select all that apply.: No steps to enter home  Type of Current Residence: Apartment  Floor Level: 1  Upon entering residence, is there a bedroom on the main floor (no further steps)?: Yes  Upon entering residence, is there a bathroom on the main floor (no further steps)?: Yes  Living Arrangements: Lives Alone  Is patient a ?: No    Activities of Daily Living Prior to Admission  Functional Status: Independent  Completes ADLs independently?: Yes  Ambulates independently?: Yes  Does patient use assisted devices?: No  Does patient currently own DME?: No  Does the patient have a history of Short-Term Rehab?: Yes (STR in [place])  Does patient have a history of HHC?: No  Does patient currently have HHC?: No    Patient Information Continued  Income Source: SSI/SSD  Does patient have prescription coverage?: Yes  Can the patient afford their medications and any related supplies (such as glucometers or test strips)?: Yes  Does patient receive dialysis treatments?: No  Does patient have a history of substance abuse?: No  Does patient have a history of Mental Health Diagnosis?: Yes (depression)  Is patient receiving treatment for mental health?: Yes  Has patient received inpatient treatment related to mental health in the last 2 years?: Yes    Means of Transportation  Means of Transport to Appts:: Family transport    DISCHARGE DETAILS:    Discharge planning discussed with:: Patient  Freedom of Choice: Yes  Comments - Freedom of Choice: Discussed FOC  CM contacted family/caregiver?: No- see comments (declined)       Other Referral/Resources/Interventions Provided:  Referral Comments: This CM introduced self and role to patient.  Lives alone in [facility], no KATLYN,   Independent with aDLS, no DME at home.  History of STR in [place] (unsure of name of location).  History noted of depression, reports suicide attempt within the year, with a recent psychiatric stay IP.  Denies ECT, or any state hospital admissions

## 2025-05-03 NOTE — H&P
H&P - Hospitalist   Name: Carola Wilkes 75 y.o. female I MRN: [id number]  Unit/Bed#: ED 23 I Date of Admission: 5/3/2025   Date of Service: 5/3/2025 I Hospital Day: 0     Assessment & Plan  UTI due to extended-spectrum beta lactamase (ESBL) producing Escherichia coli  History of recurrent UTI presents with ongoing dysuria, suprapubic pressure and left flank pain.  He reported being placed on multiple regimen since [place] with Bactrim then Macrobid.  Urine cultures in March showing ESBL.    UA is showing pyuria with occasional bacteria and 20-30 RBC with positive leukocyte follow-up current urine cultures  CT abdomen showed bilateral renal cortical scarring with nonobstructive right renal calculus without hydronephrosis and without evidence of pyelonephritis but notable minimal bladder wall thickening.  ED team discussed with infectious disease who recommended starting IV Zosyn.  Formal consultation placed  As needed analgesics  Leukocytosis  WBC of 14 in the setting of UTI.  Currently afebrile  If develops fever then obtain blood cultures x2  Antibiotics plan as outlined above  Monitor CBC  COPD (chronic obstructive pulmonary disease) (HCC)  Without exacerbation.  Continue albuterol and Qvar alternative  Bipolar disorder, current episode depressed, severe, without psychotic features (HCC)  Continue BuSpar 50 mg twice daily, Prozac 40 mg daily, Lamictal 200 mg twice daily and Geodon 20 mg qd      VTE Pharmacologic Prophylaxis:   Moderate Risk (Score 3-4) - Pharmacological DVT Prophylaxis Ordered: enoxaparin (Lovenox).  Code Status: Level 1 - Full Code   Discussion with family: Attempted to update  (daughter) via phone. Left voicemail.     Anticipated Length of Stay: Patient will be admitted on an inpatient basis with an anticipated length of stay of greater than 2 midnights secondary to ESBL UTI.    History of Present Illness   Chief Complaint: Urinary symptoms    Carola Wilkes is a 75 y.o. female  with a PMH of recurrent UTI, bipolar disorder, depression and COPD who presents with persistent dysuria, suprapubic discomfort and left flank pain.  Recent urine cultures in March showing ESBL.  She was initially placed on Bactrim then switched to Macrobid with no significant improvement for which she presented to ED.  Mild leukocytosis of 14 noted.  She is currently afebrile.  Started on IV Zosyn and admitted for further management    Review of Systems   Constitutional:  Negative for chills and fever.   HENT:  Negative for ear pain and sore throat.    Eyes:  Negative for pain and visual disturbance.   Respiratory:  Negative for cough and shortness of breath.    Cardiovascular:  Negative for chest pain and palpitations.   Gastrointestinal:  Positive for abdominal pain. Negative for vomiting.   Genitourinary:  Positive for dysuria and flank pain. Negative for hematuria.   Musculoskeletal:  Negative for arthralgias and back pain.   Skin:  Negative for color change and rash.   Neurological:  Negative for seizures and syncope.   All other systems reviewed and are negative.      Historical Information   Past Medical History:   Diagnosis Date    Psychiatric disorder      History reviewed. No pertinent surgical history.  Social History     Tobacco Use    Smoking status: Some Days     Average packs/day: 0.3 packs/day for 19.0 years (4.8 ttl pk-yrs)     Types: Cigarettes     Start date: 2005    Smokeless tobacco: Not on file   Vaping Use    Vaping status: Some Days    Substances: THC   Substance and Sexual Activity    Alcohol use: Not Currently    Drug use: Never    Sexual activity: Not on file     E-Cigarette/Vaping    E-Cigarette Use Current Some Day User      E-Cigarette/Vaping Substances    Nicotine No     THC Yes     CBD No     Flavoring No     Other No     Unknown No      Family history non-contributory  Social History:  Marital Status:      Meds/Allergies   I have reviewed home medications with patient  personally.  Prior to Admission medications    Medication Sig Start Date End Date Taking? Authorizing Provider   Acetaminophen 500 MG Take 1,000 mg by mouth every 6 (six) hours as needed 3/23/24   Historical Provider, MD   albuterol (Ventolin HFA) 90 mcg/act inhaler Inhale 2 puffs every 6 (six) hours as needed for wheezing 11/15/24   SHAWNEE Dubon   beclomethasone (Qvar RediHaler) 80 MCG/ACT inhaler Inhale 2 puffs 2 (two) times a day Rinse mouth after use. 11/15/24   SHAWNEE Dubon   busPIRone (BUSPAR) 15 mg tablet TAKE 1 TABLET BY MOUTH TWICE  DAILY 4/24/25   SHAWNEE Dubon   Cholecalciferol 25 MCG (1000 UT) CHEW Chew 1,000 Units daily    Historical Provider, MD   cyclobenzaprine (FLEXERIL) 10 mg tablet TAKE 1 TABLET BY MOUTH TWICE  DAILY 4/24/25   SHAWNEE Dubon   FLUoxetine (PROzac) 40 MG capsule TAKE 1 CAPSULE BY MOUTH DAILY 1/22/25   SHAWNEE Dubon   ipratropium-albuterol (DUO-NEB) 0.5-2.5 mg/3 mL nebulizer solution Inhale 3 mL    Historical Provider, MD   lamoTRIgine (LaMICtal) 200 MG tablet TAKE 1 TABLET BY MOUTH TWICE  DAILY 4/8/25   SHAWNEE Dubon   Multiple Vitamin (MULTI VITAMIN DAILY PO) Take 1 tablet by mouth daily    Historical Provider, MD   nitrofurantoin (MACROBID) 100 mg capsule Take 1 capsule (100 mg total) by mouth 2 (two) times a day for 7 days 4/29/25 5/6/25  SHAWNEE Dubon   Omega-3 Fatty Acids (Omega-3 Fish Oil) 1000 MG CAPS 1,000 mg daily    Historical Provider, MD   rosuvastatin (CRESTOR) 10 MG tablet Take 1 tablet (10 mg total) by mouth daily 4/2/25   SHAWNEE Dubon   traZODone (DESYREL) 150 mg tablet TAKE 2 TABLETS BY MOUTH DAILY AT BEDTIME 2/3/25   SHAWNEE Dubon   Vitamin E 400 units TABS Take 400 Units by mouth daily    Historical Provider, MD   ziprasidone (GEODON) 20 mg capsule Take 20 mg by mouth daily 3/12/25   Historical Provider, MD  "    Allergies   Allergen Reactions    Codeine Other (See Comments)     Other reaction(s): MAKES PT ILL       Objective :  Temp:  [97.5 °F (36.4 °C)] 97.5 °F (36.4 °C)  HR:  [70-81] 70  BP: (127-129)/() 129/111  Resp:  [18-20] 18  SpO2:  [93 %-95 %] 93 %  O2 Device: None (Room air)    Physical Exam  Vitals and nursing note reviewed.   Cardiovascular:      Rate and Rhythm: Normal rate and regular rhythm.   Pulmonary:      Effort: Pulmonary effort is normal.      Breath sounds: Normal breath sounds.   Abdominal:      General: Bowel sounds are normal.      Palpations: Abdomen is soft.   Neurological:      Mental Status: She is alert.          Lines/Drains:            Lab Results: I have reviewed the following results:  Results from last 7 days   Lab Units 05/03/25  0927   WBC Thousand/uL 14.84*   HEMOGLOBIN g/dL 14.0   HEMATOCRIT % 41.9   PLATELETS Thousands/uL 256   SEGS PCT % 73   LYMPHO PCT % 16   MONO PCT % 7   EOS PCT % 2     Results from last 7 days   Lab Units 05/03/25  0927   SODIUM mmol/L 136   POTASSIUM mmol/L 4.2   CHLORIDE mmol/L 99   CO2 mmol/L 27   BUN mg/dL 15   CREATININE mg/dL 0.71   ANION GAP mmol/L 10   CALCIUM mg/dL 9.6   ALBUMIN g/dL 4.4   TOTAL BILIRUBIN mg/dL 0.59   ALK PHOS U/L 80   ALT U/L 20   AST U/L 19   GLUCOSE RANDOM mg/dL 115             No results found for: \"HGBA1C\"        Imaging Results Review: No pertinent imaging studies reviewed.  Other Study Results Review: No additional pertinent studies reviewed.    Administrative Statements   I have spent a total time of 45 minutes in caring for this patient on the day of the visit/encounter including Diagnostic results, Reviewing/placing orders in the medical record (including tests, medications, and/or procedures), Obtaining or reviewing history  , and Communicating with other healthcare professionals .    ** Please Note: This note has been constructed using a voice recognition system. **    "

## 2025-05-03 NOTE — PLAN OF CARE
Problem: PAIN - ADULT  Goal: Verbalizes/displays adequate comfort level or baseline comfort level  Description: Interventions:- Encourage patient to monitor pain and request assistance- Assess pain using appropriate pain scale- Administer analgesics based on type and severity of pain and evaluate response- Implement non-pharmacological measures as appropriate and evaluate response- Consider cultural and social influences on pain and pain management- Notify physician/advanced practitioner if interventions unsuccessful or patient reports new pain  Outcome: Progressing     Problem: INFECTION - ADULT  Goal: Absence or prevention of progression during hospitalization  Description: INTERVENTIONS:- Assess and monitor for signs and symptoms of infection- Monitor lab/diagnostic results- Monitor all insertion sites, i.e. indwelling lines, tubes, and drains- Monitor endotracheal if appropriate and nasal secretions for changes in amount and color- [place] appropriate cooling/warming therapies per order- Administer medications as ordered- Instruct and encourage patient and family to use good hand hygiene technique- Identify and instruct in appropriate isolation precautions for identified infection/condition  Outcome: Progressing

## 2025-05-04 LAB
ANION GAP SERPL CALCULATED.3IONS-SCNC: 8 MMOL/L (ref 4–13)
ATRIAL RATE: 64 BPM
BUN SERPL-MCNC: 14 MG/DL (ref 5–25)
CALCIUM SERPL-MCNC: 8.7 MG/DL (ref 8.4–10.2)
CHLORIDE SERPL-SCNC: 98 MMOL/L (ref 96–108)
CO2 SERPL-SCNC: 29 MMOL/L (ref 21–32)
CREAT SERPL-MCNC: 0.77 MG/DL (ref 0.6–1.3)
ERYTHROCYTE [DISTWIDTH] IN BLOOD BY AUTOMATED COUNT: 13.2 % (ref 11.6–15.1)
GFR SERPL CREATININE-BSD FRML MDRD: 75 ML/MIN/1.73SQ M
GLUCOSE SERPL-MCNC: 108 MG/DL (ref 65–140)
HCT VFR BLD AUTO: 37.9 % (ref 34.8–46.1)
HGB BLD-MCNC: 12.1 G/DL (ref 11.5–15.4)
MCH RBC QN AUTO: 32.1 PG (ref 26.8–34.3)
MCHC RBC AUTO-ENTMCNC: 31.9 G/DL (ref 31.4–37.4)
MCV RBC AUTO: 101 FL (ref 82–98)
P AXIS: 63 DEGREES
PLATELET # BLD AUTO: 205 THOUSANDS/UL (ref 149–390)
PMV BLD AUTO: 9.4 FL (ref 8.9–12.7)
POTASSIUM SERPL-SCNC: 4.5 MMOL/L (ref 3.5–5.3)
PR INTERVAL: 170 MS
QRS AXIS: 27 DEGREES
QRSD INTERVAL: 80 MS
QT INTERVAL: 416 MS
QTC INTERVAL: 429 MS
RBC # BLD AUTO: 3.77 MILLION/UL (ref 3.81–5.12)
SODIUM SERPL-SCNC: 135 MMOL/L (ref 135–147)
T WAVE AXIS: 55 DEGREES
VENTRICULAR RATE: 64 BPM
WBC # BLD AUTO: 8.63 THOUSAND/UL (ref 4.31–10.16)

## 2025-05-04 PROCEDURE — 93005 ELECTROCARDIOGRAM TRACING: CPT

## 2025-05-04 PROCEDURE — 99222 1ST HOSP IP/OBS MODERATE 55: CPT | Performed by: INTERNAL MEDICINE

## 2025-05-04 PROCEDURE — 99232 SBSQ HOSP IP/OBS MODERATE 35: CPT | Performed by: PHYSICIAN ASSISTANT

## 2025-05-04 PROCEDURE — 80048 BASIC METABOLIC PNL TOTAL CA: CPT | Performed by: INTERNAL MEDICINE

## 2025-05-04 PROCEDURE — 93010 ELECTROCARDIOGRAM REPORT: CPT | Performed by: INTERNAL MEDICINE

## 2025-05-04 PROCEDURE — 85027 COMPLETE CBC AUTOMATED: CPT | Performed by: INTERNAL MEDICINE

## 2025-05-04 PROCEDURE — G0545 PR INHERENT VISIT TO INPT: HCPCS | Performed by: INTERNAL MEDICINE

## 2025-05-04 RX ORDER — PHENAZOPYRIDINE HYDROCHLORIDE 100 MG/1
100 TABLET, FILM COATED ORAL ONCE
Status: DISCONTINUED | OUTPATIENT
Start: 2025-05-04 | End: 2025-05-04

## 2025-05-04 RX ORDER — HYDROMORPHONE HYDROCHLORIDE 2 MG/1
1 TABLET ORAL EVERY 4 HOURS PRN
Refills: 0 | Status: DISCONTINUED | OUTPATIENT
Start: 2025-05-04 | End: 2025-05-05

## 2025-05-04 RX ORDER — ONDANSETRON 2 MG/ML
4 INJECTION INTRAMUSCULAR; INTRAVENOUS EVERY 6 HOURS PRN
Status: DISCONTINUED | OUTPATIENT
Start: 2025-05-04 | End: 2025-05-06 | Stop reason: HOSPADM

## 2025-05-04 RX ORDER — LORATADINE 10 MG/1
5 TABLET ORAL ONCE
Status: COMPLETED | OUTPATIENT
Start: 2025-05-04 | End: 2025-05-04

## 2025-05-04 RX ADMIN — BUSPIRONE HYDROCHLORIDE 15 MG: 5 TABLET ORAL at 08:57

## 2025-05-04 RX ADMIN — HYDROMORPHONE HYDROCHLORIDE 0.5 MG: 1 INJECTION, SOLUTION INTRAMUSCULAR; INTRAVENOUS; SUBCUTANEOUS at 14:14

## 2025-05-04 RX ADMIN — OXYCODONE HYDROCHLORIDE 5 MG: 5 TABLET ORAL at 07:34

## 2025-05-04 RX ADMIN — B-COMPLEX W/ C & FOLIC ACID TAB 1 TABLET: TAB at 08:57

## 2025-05-04 RX ADMIN — HYDROMORPHONE HYDROCHLORIDE 1 MG: 2 TABLET ORAL at 20:47

## 2025-05-04 RX ADMIN — PIPERACILLIN AND TAZOBACTAM 4.5 G: 36; 4.5 INJECTION, POWDER, FOR SOLUTION INTRAVENOUS at 08:57

## 2025-05-04 RX ADMIN — OXYCODONE HYDROCHLORIDE 5 MG: 5 TABLET ORAL at 18:09

## 2025-05-04 RX ADMIN — ENOXAPARIN SODIUM 40 MG: 40 INJECTION SUBCUTANEOUS at 08:58

## 2025-05-04 RX ADMIN — PRAVASTATIN SODIUM 80 MG: 80 TABLET ORAL at 18:09

## 2025-05-04 RX ADMIN — ZIPRASIDONE HCL 20 MG: 20 CAPSULE ORAL at 22:42

## 2025-05-04 RX ADMIN — ONDANSETRON 4 MG: 2 INJECTION INTRAMUSCULAR; INTRAVENOUS at 20:58

## 2025-05-04 RX ADMIN — PIPERACILLIN AND TAZOBACTAM 4.5 G: 36; 4.5 INJECTION, POWDER, FOR SOLUTION INTRAVENOUS at 18:08

## 2025-05-04 RX ADMIN — Medication 400 UNITS: at 08:57

## 2025-05-04 RX ADMIN — Medication 1000 UNITS: at 08:57

## 2025-05-04 RX ADMIN — HYDROMORPHONE HYDROCHLORIDE 0.5 MG: 1 INJECTION, SOLUTION INTRAMUSCULAR; INTRAVENOUS; SUBCUTANEOUS at 08:58

## 2025-05-04 RX ADMIN — TRAZODONE HYDROCHLORIDE 150 MG: 50 TABLET ORAL at 22:41

## 2025-05-04 RX ADMIN — CYCLOBENZAPRINE HYDROCHLORIDE 10 MG: 10 TABLET, FILM COATED ORAL at 18:09

## 2025-05-04 RX ADMIN — OXYCODONE HYDROCHLORIDE 5 MG: 5 TABLET ORAL at 13:10

## 2025-05-04 RX ADMIN — FLUOXETINE HYDROCHLORIDE 40 MG: 20 CAPSULE ORAL at 08:57

## 2025-05-04 RX ADMIN — BUSPIRONE HYDROCHLORIDE 15 MG: 5 TABLET ORAL at 20:47

## 2025-05-04 RX ADMIN — LAMOTRIGINE 200 MG: 100 TABLET ORAL at 18:09

## 2025-05-04 RX ADMIN — LORATADINE 5 MG: 10 TABLET ORAL at 20:58

## 2025-05-04 RX ADMIN — OXYCODONE HYDROCHLORIDE 5 MG: 5 TABLET ORAL at 02:03

## 2025-05-04 RX ADMIN — CYCLOBENZAPRINE HYDROCHLORIDE 10 MG: 10 TABLET, FILM COATED ORAL at 08:57

## 2025-05-04 RX ADMIN — LAMOTRIGINE 200 MG: 100 TABLET ORAL at 08:57

## 2025-05-04 NOTE — ED ATTENDING ATTESTATION
5/3/2025  I, Tay Richards MD, saw and evaluated the patient. I have discussed the patient with the resident/non-physician practitioner and agree with the resident's/non-physician practitioner's findings, Plan of Care, and MDM as documented in the resident's/non-physician practitioner's note, except where noted. All available labs and Radiology studies were reviewed.  I was present for key portions of any procedure(s) performed by the resident/non-physician practitioner and I was immediately available to provide assistance.       At this point I agree with the current assessment done in the Emergency Department.  I have conducted an independent evaluation of this patient a history and physical is as follows:    ED Course     Impression: Dysuria, left flank pain history of recent/recurrent UTIs history of ESBL    Differential diagnosis: At risk for urinary tract infection, at risk for pyelonephritis doubt sepsis, patient's history of ESBL does impact management as she is limited in antibiotic regimens to treat both cystitis and pyelonephritis due to resistance upon review of medical records.    Patient is a 75-year-old female with history of recurrent UTIs ESBL MDRO who presents with left flank pain, and dysuria symptoms.  Patient is recently completed 2 courses of Macrobid without improvement of symptoms.  She does admit to mild chills denies fevers.  Mild nausea and pain in her left flank.  CVA tenderness present on left.  Abdomen soft nontender nondistended normal bowel sounds.  Extremities no edema.    Plan to give trial multimodal analgesia for pain antiemetics.  Will check CTAP to rule out pyelonephritis check urinalysis and urine culture.  Discussed case with infectious diseases regarding appropriate antibiotic coverage given her history of ESBL and MDRO.  Anticipate admission for IV antibiotics and further evaluation management.    Case discussed with ID recommending Zosyn at this time for patient's  infection given prior culture results.    Labs reviewed: Urinalysis remarkable for large leukocytes.  Urine microscopy 23 red cells normal white cells white cell clumps present bacteria occasional seen.  Concerning for pyelonephritis.  Culture sent.  CBC with differential  for leukocytosis.  CMP unremarkable    Case discussed with medicine service will admit to  medicine.  Critical Care Time  Procedures

## 2025-05-04 NOTE — ASSESSMENT & PLAN NOTE
Patient reports UTI since age of 1.  Previously evaluated by urology but no records available.  Recent ESBL E. coli late March treated with oral Macrobid.  Presenting with persistent symptoms.  Clinically stable without formal sepsis criteria.  CT A/P without stone or obstruction.  Consider persistence/recurrence due to lack of penetration of Macrobid into kidney.    Continue Zosyn for now  Follow-up repeat urine culture and tailor antibiotics as indicated  As patient has relocated to the [facility], may be prudent to reengage with urology follow-up as an outpatient

## 2025-05-04 NOTE — CONSULTS
Consultation - Infectious Disease   Name: Carola Wilkes 75 y.o. female I MRN: [id number]  Unit/Bed#: -01 I Date of Admission: 5/3/2025   Date of Service: 5/4/2025 I Hospital Day: 1   Inpatient consult to Infectious Diseases  Consult performed by: Sofya Cortes MD  Consult ordered by: Gasper Tapia MD        Physician Requesting Evaluation: Joaquina Singer MD   Reason for Evaluation / Principal Problem: UTI    Assessment & Plan  Recurrent UTI  Patient reports UTI since age of 1.  Previously evaluated by urology but no records available.  Recent ESBL E. coli late March treated with oral Macrobid.  Presenting with persistent symptoms.  Clinically stable without formal sepsis criteria.  CT A/P without stone or obstruction.  Consider persistence/recurrence due to lack of penetration of Macrobid into kidney.    Continue Zosyn for now  Follow-up repeat urine culture and tailor antibiotics as indicated  As patient has relocated to the [facility], may be prudent to reengage with urology follow-up as an outpatient  Leukocytosis  Likely due to UTI as above.  No other appreciable source of infection.    Continue antibiotics as above  Follow temperatures closely  Recheck CBC in a.m. to monitor infection  Supportive care as per the primary service    I have discussed with CAL Singh with [facility] the above plan to continue IV antibiotics pending urine cutlures. He agrees with the plan.    Antibiotics:  Zosyn #2    History of Present Illness   Carola Wilkes is a 75 y.o. year old female with history of recurrent UTI.  Per chart prior infection was January 2024.  Recently saw PCP 3/31/2025 and describes several days of urinary frequency and dysuria at that time.  Also with lower abdominal pressure and left flank pain.  Started on Bactrim x 5 days.  Urine culture grew ESBL E. coli.  Was changed to nitrofurantoin.  She had worsening symptoms and came to the ED yesterday.  Upon presentation was noted to be afebrile with a  normal heart rate.  Had a mild leukocytosis.  UA showed pyuria.  CT A/P was unremarkable other than minimal bladder wall thickening.  She was started on Zosyn.  We are asked to comment on further evaluation and management.    A complete review of systems is negative other than that noted in the HPI.    Medical History Review: I have reviewed the patient's PMH, PSH, Social History, Family History, Meds, and Allergies     Objective :  Temp:  [97.3 °F (36.3 °C)-97.9 °F (36.6 °C)] 97.9 °F (36.6 °C)  HR:  [70-72] 72  BP: (105-131)/() 125/69  Resp:  [16-20] 16  SpO2:  [91 %-95 %] 91 %  O2 Device: None (Room air)    General:  No acute distress  Psychiatric:  Awake and alert  Pulmonary:  Normal respiratory excursion without accessory muscle use  Abdomen:  Soft, nontender  Extremities:  No edema  Skin:  No rashes      Lab Results: I have reviewed the following results:  Results from last 7 days   Lab Units 05/04/25 0459 05/03/25  0927   WBC Thousand/uL 8.63 14.84*   HEMOGLOBIN g/dL 12.1 14.0   PLATELETS Thousands/uL 205 256     Results from last 7 days   Lab Units 05/04/25 0459 05/03/25  0927   SODIUM mmol/L 135 136   POTASSIUM mmol/L 4.5 4.2   CHLORIDE mmol/L 98 99   CO2 mmol/L 29 27   BUN mg/dL 14 15   CREATININE mg/dL 0.77 0.71   EGFR ml/min/1.73sq m 75 82   CALCIUM mg/dL 8.7 9.6   AST U/L  --  19   ALT U/L  --  20   ALK PHOS U/L  --  80   ALBUMIN g/dL  --  4.4                           Imaging Results Review: I personally reviewed the following image studies in PACS and associated radiology reports: CT abdomen/pelvis. My interpretation of the radiology images/reports is: No obstruction or pyelonephritis.

## 2025-05-04 NOTE — PLAN OF CARE
Problem: PAIN - ADULT  Goal: Verbalizes/displays adequate comfort level or baseline comfort level  Description: Interventions:- Encourage patient to monitor pain and request assistance- Assess pain using appropriate pain scale- Administer analgesics based on type and severity of pain and evaluate response- Implement non-pharmacological measures as appropriate and evaluate response- Consider cultural and social influences on pain and pain management- Notify physician/advanced practitioner if interventions unsuccessful or patient reports new pain  Outcome: Progressing     Problem: INFECTION - ADULT  Goal: Absence or prevention of progression during hospitalization  Description: INTERVENTIONS:- Assess and monitor for signs and symptoms of infection- Monitor lab/diagnostic results- Monitor all insertion sites, i.e. indwelling lines, tubes, and drains- Monitor endotracheal if appropriate and nasal secretions for changes in amount and color- [place] appropriate cooling/warming therapies per order- Administer medications as ordered- Instruct and encourage patient and family to use good hand hygiene technique- Identify and instruct in appropriate isolation precautions for identified infection/condition  Outcome: Progressing     Problem: INFECTION - ADULT  Goal: Absence of fever/infection during neutropenic period  Description: INTERVENTIONS:- Monitor WBC  Outcome: Progressing     Problem: DISCHARGE PLANNING  Goal: Discharge to home or other facility with appropriate resources  Description: INTERVENTIONS:- Identify barriers to discharge w/patient and caregiver- Arrange for needed discharge resources and transportation as appropriate- Identify discharge learning needs (meds, wound care, etc.)- Arrange for interpretive services to assist at discharge as needed- Refer to Case Management Department for coordinating discharge planning if the patient needs post-hospital services based on physician/advanced practitioner order  or complex needs related to functional status, cognitive ability, or social support system  Outcome: Progressing     Problem: Knowledge Deficit  Goal: Patient/family/caregiver demonstrates understanding of disease process, treatment plan, medications, and discharge instructions  Description: Complete learning assessment and assess knowledge base.Interventions:- Provide teaching at level of understanding- Provide teaching via preferred learning methods  Outcome: Progressing     Problem: GENITOURINARY - ADULT  Goal: Maintains or returns to baseline urinary function  Description: INTERVENTIONS:- Assess urinary function- Encourage oral fluids to ensure adequate hydration if ordered- Administer IV fluids as ordered to ensure adequate hydration- Administer ordered medications as needed- Offer frequent toileting- Follow urinary retention protocol if ordered  Outcome: Progressing     Problem: GENITOURINARY - ADULT  Goal: Urinary catheter remains patent  Description: INTERVENTIONS:- Assess patency of urinary catheter- If patient has a chronic mcgill, consider changing catheter if non-functioning- Follow guidelines for intermittent irrigation of non-functioning urinary catheter  Outcome: Progressing

## 2025-05-04 NOTE — ASSESSMENT & PLAN NOTE
WBC of 14 on admission  In the setting of UTI as above.  No other appreciable source at this time  Leukocytosis resolved today  Afebrile.  No other SIRS criteria  ID following, appreciate their ongoing recommendations.  Continue IV Zosyn for now as above

## 2025-05-04 NOTE — PLAN OF CARE
Problem: PAIN - ADULT  Goal: Verbalizes/displays adequate comfort level or baseline comfort level  Description: Interventions:- Encourage patient to monitor pain and request assistance- Assess pain using appropriate pain scale- Administer analgesics based on type and severity of pain and evaluate response- Implement non-pharmacological measures as appropriate and evaluate response- Consider cultural and social influences on pain and pain management- Notify physician/advanced practitioner if interventions unsuccessful or patient reports new pain  Outcome: Progressing     Problem: INFECTION - ADULT  Goal: Absence or prevention of progression during hospitalization  Description: INTERVENTIONS:- Assess and monitor for signs and symptoms of infection- Monitor lab/diagnostic results- Monitor all insertion sites, i.e. indwelling lines, tubes, and drains- Monitor endotracheal if appropriate and nasal secretions for changes in amount and color- [place] appropriate cooling/warming therapies per order- Administer medications as ordered- Instruct and encourage patient and family to use good hand hygiene technique- Identify and instruct in appropriate isolation precautions for identified infection/condition  Outcome: Progressing

## 2025-05-04 NOTE — ASSESSMENT & PLAN NOTE
Likely due to UTI as above.  No other appreciable source of infection.    Continue antibiotics as above  Follow temperatures closely  Recheck CBC in a.m. to monitor infection  Supportive care as per the primary service

## 2025-05-04 NOTE — ASSESSMENT & PLAN NOTE
"History of recurrent UTI (patient reports since age 1), presented with ongoing dysuria, suprapubic pressure and left flank pain.    She reported being placed on multiple antibiotics since March with Bactrim then Macrobid.    Urine cultures in March showing ESBL.    Urine culture preliminarily growing 60-69,000 gram-negative rods  CT revealed, \"Bilateral renal cortical scarring right greater than left. Nonobstructing right renal calculus. No hydronephrosis or obstructive uropathy. No CT evidence for pyelonephritis. Minimal bladder wall thickening. Cystitis not excluded\"  ID consult appreciated-continue Zosyn for now pending final urine culture susceptibility data  Pain control  "

## 2025-05-04 NOTE — PROGRESS NOTES
"Progress Note - Hospitalist   Name: Carola Wilkes 75 y.o. female I MRN: [id number]  Unit/Bed#: -01 I Date of Admission: 5/3/2025   Date of Service: 5/4/2025 I Hospital Day: 1    Assessment & Plan  Recurrent UTI  History of recurrent UTI (patient reports since age 1), presented with ongoing dysuria, suprapubic pressure and left flank pain.    She reported being placed on multiple antibiotics since March with Bactrim then Macrobid.    Urine cultures in March showing ESBL.    Urine culture preliminarily growing 60-69,000 gram-negative rods  CT revealed, \"Bilateral renal cortical scarring right greater than left. Nonobstructing right renal calculus. No hydronephrosis or obstructive uropathy. No CT evidence for pyelonephritis. Minimal bladder wall thickening. Cystitis not excluded\"  ID consult appreciated-continue Zosyn for now pending final urine culture susceptibility data  Pain control  Leukocytosis  WBC of 14 on admission  In the setting of UTI as above.  No other appreciable source at this time  Leukocytosis resolved today  Afebrile.  No other SIRS criteria  ID following, appreciate their ongoing recommendations.  Continue IV Zosyn for now as above  COPD (chronic obstructive pulmonary disease) (HCC)  Without exacerbation.  Continue albuterol and Qvar alternative  Bipolar disorder, current episode depressed, severe, without psychotic features (HCC)  Continue BuSpar 50 mg twice daily, Prozac 40 mg daily, Lamictal 200 mg twice daily and Geodon 20 mg qd    VTE Pharmacologic Prophylaxis:    Lovenox    Mobility:   Basic Mobility Inpatient Raw Score: 18  JH-HLM Goal: 6: Walk 10 steps or more  JH-HLM Achieved: 6: Walk 10 steps or more  JH-HLM Goal achieved. Continue to encourage appropriate mobility.    Patient Centered Rounds: I performed bedside rounds with nursing staff today.   Discussions with Specialists or Other Care Team Provider: Dr. Cortes - ID, and      Education and Discussions with Family " / Patient: Attempted to update  (daughter) via phone. Unable to contact. Lorena. No name on VM to confirm the recipient of any message, thus no message was left    Current Length of Stay: 1 day(s)  Current Patient Status: Inpatient   Certification Statement: The patient will continue to require additional inpatient hospital stay due to IV abx  Discharge Plan:  pending ID clearance and final UCx    Code Status: Level 1 - Full Code    Subjective   Ms. Wilkes reports pelvic pain, left flank pain, and pain with urination    Objective :  Temp:  [97.3 °F (36.3 °C)-97.9 °F (36.6 °C)] 97.9 °F (36.6 °C)  HR:  [72] 72  BP: (105-125)/(61-72) 125/69  Resp:  [16] 16  SpO2:  [91 %] 91 %  O2 Device: None (Room air)    Body mass index is 29.2 kg/m².     Input and Output Summary (last 24 hours):     Intake/Output Summary (Last 24 hours) at 5/4/2025 1300  Last data filed at 5/4/2025 1144  Gross per 24 hour   Intake 840 ml   Output --   Net 840 ml       Physical Exam  Vitals reviewed.   Constitutional:       Comments: Patient seen sitting in bed, NAD   Cardiovascular:      Rate and Rhythm: Normal rate and regular rhythm.   Pulmonary:      Effort: Pulmonary effort is normal. No respiratory distress.      Breath sounds: Normal breath sounds.   Abdominal:      General: Bowel sounds are normal.      Palpations: Abdomen is soft.      Tenderness: There is no abdominal tenderness.   Musculoskeletal:      Right lower leg: No edema.      Left lower leg: No edema.   Skin:     General: Skin is warm.   Neurological:      Mental Status: She is alert and oriented to person, place, and time.   Psychiatric:         Mood and Affect: Mood normal.           Lines/Drains:              Lab Results: I have reviewed the following results:   Results from last 7 days   Lab Units 05/04/25  0459 05/03/25  0927   WBC Thousand/uL 8.63 14.84*   HEMOGLOBIN g/dL 12.1 14.0   HEMATOCRIT % 37.9 41.9   PLATELETS Thousands/uL 205 256   SEGS PCT %  --  73    LYMPHO PCT %  --  16   MONO PCT %  --  7   EOS PCT %  --  2     Results from last 7 days   Lab Units 05/04/25  0459 05/03/25  0927   SODIUM mmol/L 135 136   POTASSIUM mmol/L 4.5 4.2   CHLORIDE mmol/L 98 99   CO2 mmol/L 29 27   BUN mg/dL 14 15   CREATININE mg/dL 0.77 0.71   ANION GAP mmol/L 8 10   CALCIUM mg/dL 8.7 9.6   ALBUMIN g/dL  --  4.4   TOTAL BILIRUBIN mg/dL  --  0.59   ALK PHOS U/L  --  80   ALT U/L  --  20   AST U/L  --  19   GLUCOSE RANDOM mg/dL 108 115                       Recent Cultures (last 7 days):   Results from last 7 days   Lab Units 05/03/25  0855   URINE CULTURE  60,000-69,000 cfu/ml Gram Negative Octaviano*       Imaging Results Review: I reviewed radiology reports from this admission including: CT abdomen/pelvis.      Last 24 Hours Medication List:     Current Facility-Administered Medications:     acetaminophen (TYLENOL) tablet 650 mg, Q6H PRN    albuterol (PROVENTIL HFA,VENTOLIN HFA) inhaler 2 puff, Q6H PRN    busPIRone (BUSPAR) tablet 15 mg, BID    Cholecalciferol (VITAMIN D3) tablet 1,000 Units, Daily    cyclobenzaprine (FLEXERIL) tablet 10 mg, BID    enoxaparin (LOVENOX) subcutaneous injection 40 mg, Daily    FLUoxetine (PROzac) capsule 40 mg, Daily    fluticasone (ARNUITY ELLIPTA) 100 MCG/ACT inhaler 1 puff, Daily    HYDROmorphone (DILAUDID) injection 0.5 mg, Q4H PRN    lamoTRIgine (LaMICtal) tablet 200 mg, BID    multivitamin stress formula tablet 1 tablet, Daily    oxyCODONE (ROXICODONE) IR tablet 5 mg, Q4H PRN    piperacillin-tazobactam (ZOSYN) 4.5 g in sodium chloride 0.9 % 100 mL IVPB (EXTENDED INFUSION), Q8H, Last Rate: 4.5 g (05/04/25 0857)    polyethylene glycol (MIRALAX) packet 17 g, Daily PRN    pravastatin (PRAVACHOL) tablet 80 mg, Daily With Dinner    traZODone (DESYREL) tablet 150 mg, HS    vitamin E (TOCOPHEROL) capsule 400 Units, Daily    ziprasidone (GEODON) capsule 20 mg, HS    Administrative Statements   Today, Patient Was Seen By: Kamron Kay PA-C      **Please Note:  This note may have been constructed using a voice recognition system.**

## 2025-05-05 LAB
ANION GAP SERPL CALCULATED.3IONS-SCNC: 9 MMOL/L (ref 4–13)
BACTERIA UR CULT: ABNORMAL
BUN SERPL-MCNC: 9 MG/DL (ref 5–25)
CALCIUM SERPL-MCNC: 8.7 MG/DL (ref 8.4–10.2)
CHLORIDE SERPL-SCNC: 101 MMOL/L (ref 96–108)
CO2 SERPL-SCNC: 29 MMOL/L (ref 21–32)
CREAT SERPL-MCNC: 0.63 MG/DL (ref 0.6–1.3)
ERYTHROCYTE [DISTWIDTH] IN BLOOD BY AUTOMATED COUNT: 13.2 % (ref 11.6–15.1)
GFR SERPL CREATININE-BSD FRML MDRD: 87 ML/MIN/1.73SQ M
GLUCOSE SERPL-MCNC: 103 MG/DL (ref 65–140)
HCT VFR BLD AUTO: 37 % (ref 34.8–46.1)
HGB BLD-MCNC: 12.1 G/DL (ref 11.5–15.4)
MCH RBC QN AUTO: 32.8 PG (ref 26.8–34.3)
MCHC RBC AUTO-ENTMCNC: 32.7 G/DL (ref 31.4–37.4)
MCV RBC AUTO: 100 FL (ref 82–98)
PLATELET # BLD AUTO: 216 THOUSANDS/UL (ref 149–390)
PMV BLD AUTO: 9.4 FL (ref 8.9–12.7)
POTASSIUM SERPL-SCNC: 4.1 MMOL/L (ref 3.5–5.3)
RBC # BLD AUTO: 3.69 MILLION/UL (ref 3.81–5.12)
SODIUM SERPL-SCNC: 139 MMOL/L (ref 135–147)
WBC # BLD AUTO: 7.31 THOUSAND/UL (ref 4.31–10.16)

## 2025-05-05 PROCEDURE — 99232 SBSQ HOSP IP/OBS MODERATE 35: CPT | Performed by: PHYSICIAN ASSISTANT

## 2025-05-05 PROCEDURE — G0545 PR INHERENT VISIT TO INPT: HCPCS | Performed by: INTERNAL MEDICINE

## 2025-05-05 PROCEDURE — 99232 SBSQ HOSP IP/OBS MODERATE 35: CPT | Performed by: INTERNAL MEDICINE

## 2025-05-05 PROCEDURE — 80048 BASIC METABOLIC PNL TOTAL CA: CPT | Performed by: PHYSICIAN ASSISTANT

## 2025-05-05 PROCEDURE — 85027 COMPLETE CBC AUTOMATED: CPT | Performed by: PHYSICIAN ASSISTANT

## 2025-05-05 RX ORDER — OXYCODONE HYDROCHLORIDE 5 MG/1
5 TABLET ORAL EVERY 4 HOURS PRN
Refills: 0 | Status: DISCONTINUED | OUTPATIENT
Start: 2025-05-05 | End: 2025-05-05

## 2025-05-05 RX ORDER — OXYCODONE HYDROCHLORIDE 10 MG/1
10 TABLET ORAL EVERY 6 HOURS PRN
Refills: 0 | Status: DISCONTINUED | OUTPATIENT
Start: 2025-05-05 | End: 2025-05-06 | Stop reason: HOSPADM

## 2025-05-05 RX ORDER — KETOROLAC TROMETHAMINE 30 MG/ML
15 INJECTION, SOLUTION INTRAMUSCULAR; INTRAVENOUS EVERY 6 HOURS PRN
Status: DISCONTINUED | OUTPATIENT
Start: 2025-05-05 | End: 2025-05-06 | Stop reason: HOSPADM

## 2025-05-05 RX ORDER — CEFADROXIL 500 MG/1
1000 CAPSULE ORAL EVERY 12 HOURS SCHEDULED
Status: DISCONTINUED | OUTPATIENT
Start: 2025-05-05 | End: 2025-05-06 | Stop reason: HOSPADM

## 2025-05-05 RX ORDER — OXYCODONE HYDROCHLORIDE 5 MG/1
5 TABLET ORAL EVERY 6 HOURS PRN
Refills: 0 | Status: DISCONTINUED | OUTPATIENT
Start: 2025-05-05 | End: 2025-05-06 | Stop reason: HOSPADM

## 2025-05-05 RX ORDER — ACETAMINOPHEN 325 MG/1
975 TABLET ORAL EVERY 8 HOURS SCHEDULED
Status: DISCONTINUED | OUTPATIENT
Start: 2025-05-05 | End: 2025-05-06 | Stop reason: HOSPADM

## 2025-05-05 RX ORDER — OXYCODONE HYDROCHLORIDE 5 MG/1
5 TABLET ORAL EVERY 6 HOURS PRN
Refills: 0 | Status: DISCONTINUED | OUTPATIENT
Start: 2025-05-05 | End: 2025-05-05

## 2025-05-05 RX ADMIN — Medication 1000 UNITS: at 09:36

## 2025-05-05 RX ADMIN — ONDANSETRON 4 MG: 2 INJECTION INTRAMUSCULAR; INTRAVENOUS at 23:09

## 2025-05-05 RX ADMIN — PRAVASTATIN SODIUM 80 MG: 80 TABLET ORAL at 17:41

## 2025-05-05 RX ADMIN — BUSPIRONE HYDROCHLORIDE 15 MG: 5 TABLET ORAL at 09:36

## 2025-05-05 RX ADMIN — PIPERACILLIN AND TAZOBACTAM 4.5 G: 36; 4.5 INJECTION, POWDER, FOR SOLUTION INTRAVENOUS at 03:06

## 2025-05-05 RX ADMIN — ENOXAPARIN SODIUM 40 MG: 40 INJECTION SUBCUTANEOUS at 09:37

## 2025-05-05 RX ADMIN — OXYCODONE HYDROCHLORIDE 5 MG: 5 TABLET ORAL at 14:54

## 2025-05-05 RX ADMIN — LAMOTRIGINE 200 MG: 100 TABLET ORAL at 17:41

## 2025-05-05 RX ADMIN — ACETAMINOPHEN 975 MG: 325 TABLET ORAL at 13:07

## 2025-05-05 RX ADMIN — HYDROMORPHONE HYDROCHLORIDE 0.5 MG: 1 INJECTION, SOLUTION INTRAMUSCULAR; INTRAVENOUS; SUBCUTANEOUS at 10:46

## 2025-05-05 RX ADMIN — ZIPRASIDONE HCL 20 MG: 20 CAPSULE ORAL at 23:08

## 2025-05-05 RX ADMIN — FLUOXETINE HYDROCHLORIDE 40 MG: 20 CAPSULE ORAL at 09:36

## 2025-05-05 RX ADMIN — B-COMPLEX W/ C & FOLIC ACID TAB 1 TABLET: TAB at 09:37

## 2025-05-05 RX ADMIN — CEFADROXIL 1000 MG: 500 CAPSULE ORAL at 23:08

## 2025-05-05 RX ADMIN — CYCLOBENZAPRINE HYDROCHLORIDE 10 MG: 10 TABLET, FILM COATED ORAL at 09:36

## 2025-05-05 RX ADMIN — LAMOTRIGINE 200 MG: 100 TABLET ORAL at 09:36

## 2025-05-05 RX ADMIN — BUSPIRONE HYDROCHLORIDE 15 MG: 5 TABLET ORAL at 23:08

## 2025-05-05 RX ADMIN — CYCLOBENZAPRINE HYDROCHLORIDE 10 MG: 10 TABLET, FILM COATED ORAL at 17:41

## 2025-05-05 RX ADMIN — CEFADROXIL 1000 MG: 500 CAPSULE ORAL at 13:07

## 2025-05-05 RX ADMIN — TRAZODONE HYDROCHLORIDE 150 MG: 50 TABLET ORAL at 23:08

## 2025-05-05 RX ADMIN — Medication 400 UNITS: at 09:39

## 2025-05-05 RX ADMIN — HYDROMORPHONE HYDROCHLORIDE 1 MG: 2 TABLET ORAL at 09:36

## 2025-05-05 RX ADMIN — ACETAMINOPHEN 975 MG: 325 TABLET ORAL at 23:08

## 2025-05-05 RX ADMIN — PIPERACILLIN AND TAZOBACTAM 4.5 G: 36; 4.5 INJECTION, POWDER, FOR SOLUTION INTRAVENOUS at 09:36

## 2025-05-05 RX ADMIN — FLUTICASONE FUROATE 1 PUFF: 100 POWDER RESPIRATORY (INHALATION) at 09:38

## 2025-05-05 NOTE — PLAN OF CARE
Problem: INFECTION - ADULT  Goal: Absence or prevention of progression during hospitalization  Description: INTERVENTIONS:- Assess and monitor for signs and symptoms of infection- Monitor lab/diagnostic results- Monitor all insertion sites, i.e. indwelling lines, tubes, and drains- Monitor endotracheal if appropriate and nasal secretions for changes in amount and color- [place] appropriate cooling/warming therapies per order- Administer medications as ordered- Instruct and encourage patient and family to use good hand hygiene technique- Identify and instruct in appropriate isolation precautions for identified infection/condition  Outcome: Progressing     Problem: INFECTION - ADULT  Goal: Absence of fever/infection during neutropenic period  Description: INTERVENTIONS:- Monitor WBC  Outcome: Progressing     Problem: SAFETY ADULT  Goal: Patient will remain free of falls  Description: INTERVENTIONS:- Educate patient/family on patient safety including physical limitations- Instruct patient to call for assistance with activity - Consult OT/PT to assist with strengthening/mobility - Keep Call bell within reach- Keep bed low and locked with side rails adjusted as appropriate- Keep care items and personal belongings within reach- Initiate and maintain comfort rounds- Make Fall Risk Sign visible to staff- Offer Toileting every 2 Hours, in advance of need- Initiate/Maintain bed alarm- Obtain necessary fall risk management equipment: - Apply yellow socks and bracelet for high fall risk patients- Consider moving patient to room near nurses station  Outcome: Progressing

## 2025-05-05 NOTE — ASSESSMENT & PLAN NOTE
Patient reports UTI since age of 1.  Previously evaluated by urology but no records available.  Recent ESBL E. coli late March treated with oral Macrobid.  Presenting with persistent symptoms.  Clinically stable without formal sepsis criteria.  CT A/P without stone or obstruction.  Urine culture now with Proteus    Change antibiotics to Duricef 1000 mg PO Q12 to continue through 5/12 (10 days total antibiotics)  As patient has relocated to the [facility], may be prudent to reengage with urology follow-up as an outpatient

## 2025-05-05 NOTE — PLAN OF CARE
Problem: PAIN - ADULT  Goal: Verbalizes/displays adequate comfort level or baseline comfort level  Description: Interventions:- Encourage patient to monitor pain and request assistance- Assess pain using appropriate pain scale- Administer analgesics based on type and severity of pain and evaluate response- Implement non-pharmacological measures as appropriate and evaluate response- Consider cultural and social influences on pain and pain management- Notify physician/advanced practitioner if interventions unsuccessful or patient reports new pain  5/4/2025 2347 by Rosa Blunt RN  Outcome: Progressing  5/4/2025 2346 by Rosa Blunt RN  Outcome: Progressing     Problem: INFECTION - ADULT  Goal: Absence or prevention of progression during hospitalization  Description: INTERVENTIONS:- Assess and monitor for signs and symptoms of infection- Monitor lab/diagnostic results- Monitor all insertion sites, i.e. indwelling lines, tubes, and drains- Monitor endotracheal if appropriate and nasal secretions for changes in amount and color- [place] appropriate cooling/warming therapies per order- Administer medications as ordered- Instruct and encourage patient and family to use good hand hygiene technique- Identify and instruct in appropriate isolation precautions for identified infection/condition  5/4/2025 2347 by Rosa Blunt RN  Outcome: Progressing  5/4/2025 2346 by Rosa Blunt RN  Outcome: Progressing  Goal: Absence of fever/infection during neutropenic period  Description: INTERVENTIONS:- Monitor WBC  5/4/2025 2347 by Rosa Blunt RN  Outcome: Progressing  5/4/2025 2346 by Rosa Blunt RN  Outcome: Progressing     Problem: SAFETY ADULT  Goal: Patient will remain free of falls  Description: INTERVENTIONS:- Educate patient/family on patient safety including physical limitations- Instruct patient to call for assistance with activity - Consult OT/PT to assist with strengthening/mobility - Keep Call  bell within reach- Keep bed low and locked with side rails adjusted as appropriate- Keep care items and personal belongings within reach- Initiate and maintain comfort rounds- Make Fall Risk Sign visible to staff- Offer Toileting every 2 Hours, in advance of need- Initiate/Maintain bed alarm- Obtain necessary fall risk management equipment: - Apply yellow socks and bracelet for high fall risk patients- Consider moving patient to room near nurses station  5/4/2025 2347 by Rosa Blunt RN  Outcome: Progressing  5/4/2025 2346 by Rosa Blunt RN  Outcome: Progressing  Goal: Maintain or return to baseline ADL function  Description: INTERVENTIONS:-  Assess patient's ability to carry out ADLs; assess patient's baseline for ADL function and identify physical deficits which impact ability to perform ADLs (bathing, care of mouth/teeth, toileting, grooming, dressing, etc.)- Assess/evaluate cause of self-care deficits - Assess range of motion- Assess patient's mobility; develop plan if impaired- Assess patient's need for assistive devices and provide as appropriate- Encourage maximum independence but intervene and supervise when necessary- Involve family in performance of ADLs- Assess for home care needs following discharge - Consider OT consult to assist with ADL evaluation and planning for discharge- Provide patient education as appropriate  5/4/2025 2347 by Rosa Blunt RN  Outcome: Progressing  5/4/2025 2346 by Rosa Blunt RN  Outcome: Progressing  Goal: Maintains/Returns to pre admission functional level  Description: INTERVENTIONS:- Perform AM-PAC 6 Click Basic Mobility/ Daily Activity assessment daily.- Set and communicate daily mobility goal to care team and patient/family/caregiver. - Collaborate with rehabilitation services on mobility goals if consulted- Perform Range of Motion 3 times a day.- Reposition patient every 2 hours.- Dangle patient 3 times a day- Stand patient 3 times a day- Ambulate  patient 3 times a day- Out of bed to chair 3 times a day - Out of bed for meals 3 times a day- Out of bed for toileting- Record patient progress and toleration of activity level   5/4/2025 2347 by Rosa Blunt RN  Outcome: Progressing  5/4/2025 2346 by Rosa Blunt RN  Outcome: Progressing     Problem: DISCHARGE PLANNING  Goal: Discharge to home or other facility with appropriate resources  Description: INTERVENTIONS:- Identify barriers to discharge w/patient and caregiver- Arrange for needed discharge resources and transportation as appropriate- Identify discharge learning needs (meds, wound care, etc.)- Arrange for interpretive services to assist at discharge as needed- Refer to Case Management Department for coordinating discharge planning if the patient needs post-hospital services based on physician/advanced practitioner order or complex needs related to functional status, cognitive ability, or social support system  5/4/2025 2347 by Rosa Blunt RN  Outcome: Progressing  5/4/2025 2346 by Rosa Blunt RN  Outcome: Progressing     Problem: Knowledge Deficit  Goal: Patient/family/caregiver demonstrates understanding of disease process, treatment plan, medications, and discharge instructions  Description: Complete learning assessment and assess knowledge base.Interventions:- Provide teaching at level of understanding- Provide teaching via preferred learning methods  5/4/2025 2347 by Rosa Blunt RN  Outcome: Progressing  5/4/2025 2346 by Rosa Blunt RN  Outcome: Progressing     Problem: GENITOURINARY - ADULT  Goal: Maintains or returns to baseline urinary function  Description: INTERVENTIONS:- Assess urinary function- Encourage oral fluids to ensure adequate hydration if ordered- Administer IV fluids as ordered to ensure adequate hydration- Administer ordered medications as needed- Offer frequent toileting- Follow urinary retention protocol if ordered  5/4/2025 2347 by Rosa Blunt  RN  Outcome: Progressing  5/4/2025 2346 by Rosa Blunt RN  Outcome: Progressing  Goal: Absence of urinary retention  Description: INTERVENTIONS:- Assess patient’s ability to void and empty bladder- Monitor I/O- Bladder scan as needed- Discuss with physician/AP medications to alleviate retention as needed- Discuss catheterization for long term situations as appropriate  5/4/2025 2347 by Rosa Blunt RN  Outcome: Progressing  5/4/2025 2346 by Rosa Blunt RN  Outcome: Progressing  Goal: Urinary catheter remains patent  Description: INTERVENTIONS:- Assess patency of urinary catheter- If patient has a chronic mcgill, consider changing catheter if non-functioning- Follow guidelines for intermittent irrigation of non-functioning urinary catheter  5/4/2025 2347 by Rosa Blunt RN  Outcome: Progressing  5/4/2025 2346 by Rosa Blunt RN  Outcome: Progressing

## 2025-05-05 NOTE — ASSESSMENT & PLAN NOTE
C/w PTA BuSpar 50 mg BID, Prozac 40 mg q day, Lamictal 200 mg BID and Geodon 20 mg q day   Mood stable

## 2025-05-05 NOTE — PROGRESS NOTES
"Progress Note - Hospitalist   Name: Carola Wilkes 75 y.o. female I MRN: [id number]  Unit/Bed#: -01 I Date of Admission: 5/3/2025   Date of Service: 5/5/2025 I Hospital Day: 2    Assessment & Plan  Recurrent UTI  H/o recurrent UTI (patient reports since age 1), presented w/ ongoing dysuria, suprapubic pressure and L flank pain.    CT revealed, \"Bilateral renal cortical scarring right greater than left. Nonobstructing right renal calculus. No hydronephrosis or obstructive uropathy. No CT evidence for pyelonephritis. Minimal bladder wall thickening. Cystitis not excluded\"  UCx from 03/2025 w/ ESBL treated w/ Macrobid   UCx w/ Proteus   ID consulted   S/p Zosyn day # 3, transitioned to Duricef 1000 mg q 12 hrs through 05/12 (10 days total)   C/w analgesia; d/c IV narcotics, c/w PRN PO narcotics & schedule Tylenol   Recommend ambulatory referral to urology on d/c to reestablish care   Leukocytosis  Recent Labs     05/03/25  0927 05/04/25  0459 05/05/25  0431   WBC 14.84* 8.63 7.31     WBC on admit 14.8   Leukocytosis resolved   Afebrile; no SIRS criteria  ABX as above   COPD (chronic obstructive pulmonary disease) ([facility])  No acute exacerbation  C/w albuterol and Qvar alternative  Bipolar disorder, current episode depressed, severe, without psychotic features ([facility])  C/w PTA BuSpar 50 mg BID, Prozac 40 mg q day, Lamictal 200 mg BID and Geodon 20 mg q day   Mood stable     VTE Pharmacologic Prophylaxis:   Moderate Risk (Score 3-4) - Pharmacological DVT Prophylaxis Ordered: enoxaparin (Lovenox).    Mobility:   Basic Mobility Inpatient Raw Score: 18  JH-HLM Goal: 6: Walk 10 steps or more  JH-HLM Achieved: 6: Walk 10 steps or more  JH-HLM Goal achieved. Continue to encourage appropriate mobility.    Patient Centered Rounds: I performed bedside rounds with nursing staff today.   Discussions with Specialists or Other Care Team Provider: With ID, case management, nursing    Education and Discussions with Family / Patient: " "Attempted to update  (daughter) via phone. Left voicemail.     Current Length of Stay: 2 day(s)  Current Patient Status: Inpatient   Certification Statement: The patient will continue to require additional inpatient hospital stay due to analgesic management  Discharge Plan: Anticipate discharge tomorrow to home.    Code Status: Level 1 - Full Code    Subjective   Patient notes she is feeling \"okay.\"  She still endorses dysuria, pelvic pressure, lack of appetite, and generalized fatigue.  She had a bowel movement this morning.  She is without fevers, chills, chest pain, or shortness of breath.    Objective :  Temp:  [97.9 °F (36.6 °C)-98.4 °F (36.9 °C)] 98.4 °F (36.9 °C)  HR:  [70-73] 73  BP: (125-142)/(64-84) 142/84  SpO2:  [91 %-92 %] 92 %  O2 Device: None (Room air)    Body mass index is 29.2 kg/m².     Input and Output Summary (last 24 hours):     Intake/Output Summary (Last 24 hours) at 5/5/2025 0925  Last data filed at 5/4/2025 1144  Gross per 24 hour   Intake 480 ml   Output --   Net 480 ml       Physical Exam  Constitutional:       Comments: Pleasant, overweight   HENT:      Head: Normocephalic.      Right Ear: External ear normal.      Left Ear: External ear normal.      Nose: Nose normal.      Mouth/Throat:      Mouth: Mucous membranes are moist.      Pharynx: Oropharynx is clear.   Eyes:      Extraocular Movements: Extraocular movements intact.      Conjunctiva/sclera: Conjunctivae normal.   Cardiovascular:      Rate and Rhythm: Normal rate and regular rhythm.      Pulses: Normal pulses.      Heart sounds: Normal heart sounds.   Pulmonary:      Effort: Pulmonary effort is normal. No respiratory distress.      Breath sounds: Normal breath sounds. No wheezing, rhonchi or rales.   Abdominal:      General: Bowel sounds are normal. There is no distension.      Palpations: Abdomen is soft.      Tenderness: There is no abdominal tenderness. There is no right CVA tenderness, left CVA tenderness, " guarding or rebound.   Musculoskeletal:         General: Swelling (Trace LE) present.      Cervical back: Normal range of motion.   Skin:     General: Skin is warm and dry.      Coloration: Skin is not jaundiced.      Findings: No bruising or lesion.   Neurological:      General: No focal deficit present.      Mental Status: She is alert and oriented to person, place, and time. Mental status is at baseline.   Psychiatric:         Mood and Affect: Mood normal.         Behavior: Behavior normal.         Lab Results: I have reviewed the following results:   Results from last 7 days   Lab Units 05/05/25 0431 05/04/25 0459 05/03/25  0927   WBC Thousand/uL 7.31   < > 14.84*   HEMOGLOBIN g/dL 12.1   < > 14.0   HEMATOCRIT % 37.0   < > 41.9   PLATELETS Thousands/uL 216   < > 256   SEGS PCT %  --   --  73   LYMPHO PCT %  --   --  16   MONO PCT %  --   --  7   EOS PCT %  --   --  2    < > = values in this interval not displayed.     Results from last 7 days   Lab Units 05/05/25 0431 05/04/25 0459 05/03/25  0927   SODIUM mmol/L 139   < > 136   POTASSIUM mmol/L 4.1   < > 4.2   CHLORIDE mmol/L 101   < > 99   CO2 mmol/L 29   < > 27   BUN mg/dL 9   < > 15   CREATININE mg/dL 0.63   < > 0.71   ANION GAP mmol/L 9   < > 10   CALCIUM mg/dL 8.7   < > 9.6   ALBUMIN g/dL  --   --  4.4   TOTAL BILIRUBIN mg/dL  --   --  0.59   ALK PHOS U/L  --   --  80   ALT U/L  --   --  20   AST U/L  --   --  19   GLUCOSE RANDOM mg/dL 103   < > 115    < > = values in this interval not displayed.                       Recent Cultures (last 7 days):   Results from last 7 days   Lab Units 05/03/25  0855   URINE CULTURE  60,000-69,000 cfu/ml Proteus mirabilis*       Imaging Results Review: I reviewed radiology reports from this admission including: CT abdomen/pelvis.  Other Study Results Review: No additional pertinent studies reviewed.    Last 24 Hours Medication List:     Current Facility-Administered Medications:     acetaminophen (TYLENOL) tablet 650  mg, Q6H PRN    albuterol (PROVENTIL HFA,VENTOLIN HFA) inhaler 2 puff, Q6H PRN    busPIRone (BUSPAR) tablet 15 mg, BID    Cholecalciferol (VITAMIN D3) tablet 1,000 Units, Daily    cyclobenzaprine (FLEXERIL) tablet 10 mg, BID    enoxaparin (LOVENOX) subcutaneous injection 40 mg, Daily    FLUoxetine (PROzac) capsule 40 mg, Daily    fluticasone (ARNUITY ELLIPTA) 100 MCG/ACT inhaler 1 puff, Daily    HYDROmorphone (DILAUDID) injection 0.5 mg, Q4H PRN    HYDROmorphone (DILAUDID) tablet 1 mg, Q4H PRN    lamoTRIgine (LaMICtal) tablet 200 mg, BID    multivitamin stress formula tablet 1 tablet, Daily    ondansetron (ZOFRAN) injection 4 mg, Q6H PRN    piperacillin-tazobactam (ZOSYN) 4.5 g in sodium chloride 0.9 % 100 mL IVPB (EXTENDED INFUSION), Q8H, Last Rate: 4.5 g (05/05/25 0306)    polyethylene glycol (MIRALAX) packet 17 g, Daily PRN    pravastatin (PRAVACHOL) tablet 80 mg, Daily With Dinner    traZODone (DESYREL) tablet 150 mg, HS    vitamin E (TOCOPHEROL) capsule 400 Units, Daily    ziprasidone (GEODON) capsule 20 mg, HS    Administrative Statements   Today, Patient Was Seen By: Maddi Jackson PA-C  I have spent a total time of 45 minutes in caring for this patient on the day of the visit/encounter including Diagnostic results, Prognosis, Risks and benefits of tx options, Instructions for management, Patient and family education, Impressions, Counseling / Coordination of care, Documenting in the medical record, Reviewing/placing orders in the medical record (including tests, medications, and/or procedures), Obtaining or reviewing history  , and Communicating with other healthcare professionals .    **Please Note: This note may have been constructed using a voice recognition system.**

## 2025-05-05 NOTE — UTILIZATION REVIEW
Initial Clinical Review    Admission: Date/Time/Statement:   Admission Orders (From admission, onward)       Ordered        05/03/25 1204  INPATIENT ADMISSION  Once                          Orders Placed This Encounter   Procedures    INPATIENT ADMISSION     Standing Status:   Standing     Number of Occurrences:   1     Level of Care:   Med Surg [16]     Estimated length of stay:   More than 2 Midnights     Certification:   I certify that inpatient services are medically necessary for this patient for a duration of greater than two midnights. See H&P and MD Progress Notes for additional information about the patient's course of treatment.     ED Arrival Information       Expected   -    Arrival   5/3/2025 08:34    Acuity   Urgent              Means of arrival   Walk-In    Escorted by   Family Member    Service   Hospitalist    Admission type   Emergency              Arrival complaint   Urinary problem             Chief Complaint   Patient presents with    Possible UTI     Pt reports having recurrent UTI for a month.  Pt states she been on 3 different abx for it.       Initial Presentation: 75 y.o. female to ED presents for  persistent dysuria, suprapubic discomfort and left flank pain.  Recent urine cultures in March showing ESBL.  She was initially placed on Bactrim then switched to Macrobid with no significant improvement. Mild leukocytosis of 14 noted. Started on Iv antibiotics.   PMH for Recurrent UTI, bipolar disorder, depression and COPD   Admit to Inpatient Dx; UTI due to extended ESBL, Leukocytosis  UA is showing pyuria with occasional bacteria and 20-30 RBC with positive leukocyte   Plan; Iv antibiotics. Urine culture. Monitor Cbc. Continue home meds.   CT abdomen showed bilateral renal cortical scarring with nonobstructive right renal calculus without hydronephrosis and without evidence of pyelonephritis but notable minimal bladder wall thickening.   Anticipated Length of Stay/Certification Statement:   Patient will be admitted on an inpatient basis with an anticipated length of stay of greater than 2 midnights secondary to ESBL UTI.     Date: 5/4   Day 2:   ID cons; Recurrent UTI, Leukocytosis  Recent ESBL E. coli late March treated with oral Macrobid. Persistent symptoms.  Continue Iv antibiotics. F/u urine culture. Repeat Cbc in am.     Progress notes; Urine culture preliminarily growing 60-69,000 gram-negative rods   Iv antibiotics for now pending final urine culture susceptibility data  Pain control.    Date: 5/5  Day 3: Has surpassed a 2nd midnight with active treatments and services.  Iv antibiotic transitioned to  Duricef 1000 mg q 12 hrs through 05/12 (10 days total).  Pain control. Leukocytosis resolved. Continue home meds.   Pt still endorses dysuria, pelvis pressure, lack of appetite and generalized fatigue.  Had small BM this am.  On exam; Trace swelling LE  Certification Statement: The patient will continue to require additional inpatient hospital stay due to analgesic management       ED Treatment-Medication Administration from 05/03/2025 0834 to 05/03/2025 1420         Date/Time Order Dose Route Action     05/03/2025 1157 piperacillin-tazobactam (ZOSYN) 4.5 g in sodium chloride 0.9 % 100 mL IVPB 4.5 g Intravenous New Bag     05/03/2025 1011 HYDROmorphone HCl (DILAUDID) injection 0.2 mg 0.2 mg Intravenous Given     05/03/2025 1011 ondansetron (ZOFRAN) injection 4 mg 4 mg Intravenous Given     05/03/2025 1031 iohexol (OMNIPAQUE) 350 MG/ML injection (MULTI-DOSE) 80 mL 80 mL Intravenous Given     05/03/2025 1115 HYDROmorphone (DILAUDID) injection 0.5 mg 0.5 mg Intravenous Given     05/03/2025 1333 ketorolac (TORADOL) injection 15 mg 15 mg Intravenous Given            Scheduled Medications:  acetaminophen, 975 mg, Oral, Q8H GORDY  busPIRone, 15 mg, Oral, BID  cefadroxil, 1,000 mg, Oral, Q12H GORDY  Cholecalciferol, 1,000 Units, Oral, Daily  cyclobenzaprine, 10 mg, Oral, BID  enoxaparin, 40 mg, Subcutaneous,  Daily  FLUoxetine, 40 mg, Oral, Daily  fluticasone, 1 puff, Inhalation, Daily  lamoTRIgine, 200 mg, Oral, BID  multivitamin stress formula, 1 tablet, Oral, Daily  pravastatin, 80 mg, Oral, Daily With Dinner  traZODone, 150 mg, Oral, HS  vitamin E, 400 Units, Oral, Daily  ziprasidone, 20 mg, Oral, HS    piperacillin-tazobactam (ZOSYN) 4.5 g in sodium chloride 0.9 % 100 mL IVPB (EXTENDED INFUSION)  Dose: 4.5 g  Freq: Every 8 hours Route: IV  Last Dose: 4.5 g (05/05/25 0936)  Start: 05/03/25 1600 End: 05/05/25 1140    Continuous IV Infusions: None     PRN Meds:  albuterol, 2 puff, Inhalation, Q6H PRN  ondansetron, 4 mg, Intravenous, Q6H PRN  oxyCODONE, 5 mg, Oral, Q6H PRN 5/3 x1, 5/4 x4  oxyCODONE, 2.5 mg, Oral, Q6H PRN  polyethylene glycol, 17 g, Oral, Daily PRN    HYDROmorphone (DILAUDID) injection 0.5 mg  Dose: 0.5 mg  Freq: Every 4 hours PRN Route: IV 5/3 x1, 5/4 x2, 5/5 x1  PRN Reason: other  PRN Comment: Breakthrough pain  Start: 05/03/25 1726 End: 05/05/25 1204    HYDROmorphone (DILAUDID) tablet 1 mg  Dose: 1 mg  Freq: Every 4 hours PRN Route: PO 5/4 x1, 5/5 x1  PRN Reasons: moderate pain,severe pain  Start: 05/04/25 2020 End: 05/05/25 1203      ED Triage Vitals [05/03/25 0836]   Temperature Pulse Respirations Blood Pressure SpO2 Pain Score   97.5 °F (36.4 °C) 81 18 127/76 94 % 7     Weight (last 2 days)       Date/Time Weight    05/03/25 14:25:33 79.6 (175.49)            Vital Signs (last 3 days)       Date/Time Temp Pulse Resp BP MAP (mmHg) SpO2 O2 Device Patient Position - Orthostatic VS David Coma Scale Score Pain    05/05/25 1045 -- -- -- -- -- -- -- -- -- 6    05/05/25 0936 -- -- -- -- -- -- -- -- -- 6 05/05/25 07:15:54 98.4 °F (36.9 °C) 73 -- 142/84 103 92 % -- -- -- --    05/04/25 2352 -- -- -- -- -- -- None (Room air) -- 15 --    05/04/25 22:49:32 97.9 °F (36.6 °C) 70 -- 125/64 84 91 % -- -- -- --    05/04/25 2047 -- -- -- -- -- -- -- -- -- 7    05/04/25 1809 -- -- -- -- -- -- -- -- -- 6     05/04/25 1500 -- -- -- -- -- -- -- -- 15 6    05/04/25 1414 -- -- -- -- -- -- -- -- -- 7 05/04/25 1310 -- -- -- -- -- -- -- -- -- 7 05/04/25 0858 -- -- -- -- -- -- -- -- -- 6 05/04/25 0734 -- -- -- -- -- -- -- -- -- 6 05/04/25 07:11:02 97.9 °F (36.6 °C) -- -- 125/69 88 -- -- -- -- --    05/04/25 0203 -- -- -- -- -- -- -- -- -- 7 05/03/25 2308 -- -- -- -- -- -- None (Room air) -- 15 10 - Worst Possible Pain    05/03/25 2214 -- -- -- -- -- -- -- -- -- 6 05/03/25 21:38:21 97.3 °F (36.3 °C) -- -- 123/72 89 -- -- -- -- --    05/03/25 1710 -- -- -- -- -- -- -- -- -- 6 05/03/25 1700 -- -- -- -- -- -- -- -- 15 6    05/03/25 1449 -- -- -- -- -- -- -- -- -- 8 05/03/25 1443 -- -- -- -- -- -- -- -- -- 8 05/03/25 14:25:33 97.9 °F (36.6 °C) 72 16 105/61 76 91 % -- -- -- --    05/03/25 1333 -- -- -- -- -- -- -- -- -- 5 05/03/25 1230 -- 70 16 131/76 95 92 % None (Room air) Lying -- --    05/03/25 1015 -- 70 18 -- -- 93 % None (Room air) -- -- --    05/03/25 1011 -- -- -- -- -- -- -- -- -- 6    05/03/25 1000 -- 70 20 129/111 118 95 % None (Room air) Sitting -- --    05/03/25 0900 -- -- -- -- -- -- -- -- 15 --    05/03/25 0836 97.5 °F (36.4 °C) 81 18 127/76 -- 94 % None (Room air) Sitting -- 7              Pertinent Labs/Diagnostic Test Results:   Radiology:  CT abdomen pelvis with contrast   Final Interpretation by Clint Cameron MD (05/03 1125)      Bilateral renal cortical scarring right greater than left. Nonobstructing right renal calculus. No hydronephrosis or obstructive uropathy. No CT evidence for pyelonephritis.      Minimal bladder wall thickening. Cystitis not excluded.      Additional findings as above.                  Resident: Gustavo Almonte I, the attending radiologist, have reviewed the images and agree with the final report above.      Workstation performed: [id number]           Cardiology:  ECG 12 lead   Final Result by Barrera Jang MD (05/04 4620)   Normal sinus rhythm    Increased R/S ratio in V1, consider early transition or posterior infarct   Abnormal ECG   No previous ECGs available   Confirmed by Brarera Jang [id number]) on 5/4/2025 11:18:08 PM        GI:  No orders to display           Results from last 7 days   Lab Units 05/05/25  0431 05/04/25 0459 05/03/25  0927   WBC Thousand/uL 7.31 8.63 14.84*   HEMOGLOBIN g/dL 12.1 12.1 14.0   HEMATOCRIT % 37.0 37.9 41.9   PLATELETS Thousands/uL 216 205 256   TOTAL NEUT ABS Thousands/µL  --   --  10.96*         Results from last 7 days   Lab Units 05/05/25  0431 05/04/25 0459 05/03/25  0927   SODIUM mmol/L 139 135 136   POTASSIUM mmol/L 4.1 4.5 4.2   CHLORIDE mmol/L 101 98 99   CO2 mmol/L 29 29 27   ANION GAP mmol/L 9 8 10   BUN mg/dL 9 14 15   CREATININE mg/dL 0.63 0.77 0.71   EGFR ml/min/1.73sq m 87 75 82   CALCIUM mg/dL 8.7 8.7 9.6     Results from last 7 days   Lab Units 05/03/25  0927   AST U/L 19   ALT U/L 20   ALK PHOS U/L 80   TOTAL PROTEIN g/dL 7.3   ALBUMIN g/dL 4.4   TOTAL BILIRUBIN mg/dL 0.59         Results from last 7 days   Lab Units 05/05/25  0431 05/04/25 0459 05/03/25  0927   GLUCOSE RANDOM mg/dL 103 108 115       Results from last 7 days   Lab Units 05/03/25  0855   CLARITY UA  Extra Turbid   COLOR UA  Yellow   SPEC GRAV UA  1.012   PH UA  6.0   GLUCOSE UA mg/dl Negative   KETONES UA mg/dl Negative   BLOOD UA  Small*   PROTEIN UA mg/dl Trace*   NITRITE UA  Negative   BILIRUBIN UA  Negative   UROBILINOGEN UA (BE) mg/dl <2.0   LEUKOCYTES UA  Large*   WBC UA /hpf Innumerable*   RBC UA /hpf 20-30*   BACTERIA UA /hpf Occasional   EPITHELIAL CELLS WET PREP /hpf None Seen       Results from last 7 days   Lab Units 05/03/25  0855   URINE CULTURE  60,000-69,000 cfu/ml Proteus mirabilis*                   Past Medical History:   Diagnosis Date    Psychiatric disorder      Present on Admission:   Recurrent UTI   COPD (chronic obstructive pulmonary disease) (HCC)   Bipolar disorder, current episode depressed, severe, without  psychotic features (HCC)      Admitting Diagnosis: Dysuria [R30.0]  UTI (urinary tract infection) [N39.0]  Cystitis [N30.90]  ESBL (extended spectrum beta-lactamase) producing bacteria infection [A49.9, Z16.12]  Age/Sex: 75 y.o. female    Network Utilization Review Department  ATTENTION: Please call with any questions or concerns to [phone] and carefully listen to the prompts so that you are directed to the right person. All voicemails are confidential.   For Discharge needs, contact Care Management DC Support Team at [phone] opt. [phone]  Send all requests for admission clinical reviews, approved or denied determinations and any other requests to dedicated fax number below belonging to the campus where the patient is receiving treatment. List of dedicated fax numbers for the Facilities:  FACILITY NAME UR FAX NUMBER   ADMISSION DENIALS (Administrative/Medical Necessity) [phone]   DISCHARGE SUPPORT TEAM (NETWORK) [phone]   PARENT CHILD HEALTH (Maternity/NICU/Pediatrics) [phone]   [facility] [phone]   [facility] [phone]   [facility] [phone]   [facility] [phone]   [facility] [phone]   [facility] [phone]   [facility] [phone]   [facility] [phone]   [facility] [phone]   Count includes the [facility] [phone]   [facility] [phone]   [facility] [phone]

## 2025-05-05 NOTE — CASE MANAGEMENT
Case Management Discharge Planning Note    Patient name Carola Wilkes  Location /-01 MRN [id number]  : 1950 Date 2025       Current Admission Date: 5/3/2025  Current Admission Diagnosis:Recurrent UTI   Patient Active Problem List    Diagnosis Date Noted Date Diagnosed    Leukocytosis 2025     COPD (chronic obstructive pulmonary disease) ([facility]) 2024     Recurrent UTI 2024     Diarrhea 2024     Lesion of nose 2024     Bipolar disorder, current episode depressed, severe, without psychotic features ([facility]) 2024     Psychosis ([facility]) 2024     Suicide attempt ([facility]) 2024     Anxiety 2023     Cervical spondylosis 2023       LOS (days): 2  Geometric Mean LOS (GMLOS) (days): 2.8  Days to GMLOS:0.6     OBJECTIVE:  Risk of Unplanned Readmission Score: 8.56         Current admission status: Inpatient   Preferred Pharmacy:   [facility] - [address] [address]  Phone: [phone] Fax: [phone]    [id number] - [place], [place] - [address] [place] [address]  [place] [address]  Phone: [phone] Fax: [phone]    Primary Care Provider: SHAWNEE Dubon    Primary Insurance: [employer] REP  Secondary Insurance:     DISCHARGE DETAILS:    IMM Given (Date):: 25  IMM Given to:: Patient    IMM reviewed with patient, patient agrees with discharge determination.

## 2025-05-05 NOTE — ASSESSMENT & PLAN NOTE
"H/o recurrent UTI (patient reports since age 1), presented w/ ongoing dysuria, suprapubic pressure and L flank pain.    CT revealed, \"Bilateral renal cortical scarring right greater than left. Nonobstructing right renal calculus. No hydronephrosis or obstructive uropathy. No CT evidence for pyelonephritis. Minimal bladder wall thickening. Cystitis not excluded\"  UCx from 03/2025 w/ ESBL treated w/ Macrobid   UCx w/ Proteus   ID consulted   S/p Zosyn day # 3, transitioned to Duricef 1000 mg q 12 hrs through 05/12 (10 days total)   C/w analgesia; d/c IV narcotics, c/w PRN PO narcotics & schedule Tylenol   Recommend ambulatory referral to urology on d/c to reestablish care   "

## 2025-05-05 NOTE — ASSESSMENT & PLAN NOTE
Recent Labs     05/03/25  0927 05/04/25  0459 05/05/25  0431   WBC 14.84* 8.63 7.31     WBC on admit 14.8   Leukocytosis resolved   Afebrile; no SIRS criteria  ABX as above

## 2025-05-05 NOTE — PROGRESS NOTES
Progress Note - Infectious Disease   Name: Carola Wilkes 75 y.o. female I MRN: [id number]  Unit/Bed#: -01 I Date of Admission: 5/3/2025   Date of Service: 5/5/2025 I Hospital Day: 2    Assessment & Plan  Recurrent UTI  Patient reports UTI since age of 1.  Previously evaluated by urology but no records available.  Recent ESBL E. coli late March treated with oral Macrobid.  Presenting with persistent symptoms.  Clinically stable without formal sepsis criteria.  CT A/P without stone or obstruction.  Urine culture now with Proteus    Change antibiotics to Duricef 1000 mg PO Q12 to continue through 5/12 (10 days total antibiotics)  As patient has relocated to the [facility], may be prudent to reengage with urology follow-up as an outpatient  Leukocytosis  Likely due to UTI as above.  No other appreciable source of infection.  Improved.    Continue antibiotics as above    Ok for discharge from Infectious Disease service perspective.    Antibiotics:  Zosyn #3    Subjective   Patient has no fever, chills, sweats; no nausea, vomiting, diarrhea; no cough, shortness of breath.  Overall feels better.  Left flank pain improved but still with burning during urination.      Objective :  Temp:  [97.9 °F (36.6 °C)-98.4 °F (36.9 °C)] 98.4 °F (36.9 °C)  HR:  [70-73] 73  BP: (125-142)/(64-84) 142/84  SpO2:  [91 %-92 %] 92 %  O2 Device: None (Room air)    General:  No acute distress  Psychiatric:  Awake and alert  Pulmonary:  Normal respiratory excursion without accessory muscle use  Abdomen:  Soft, nontender  Extremities:  No edema  Skin:  No rashes      Lab Results: I have reviewed the following results:  Results from last 7 days   Lab Units 05/05/25  0431 05/04/25 0459 05/03/25  0927   WBC Thousand/uL 7.31 8.63 14.84*   HEMOGLOBIN g/dL 12.1 12.1 14.0   PLATELETS Thousands/uL 216 205 256     Results from last 7 days   Lab Units 05/05/25  0431 05/04/25 0459 05/03/25  0927   SODIUM mmol/L 139 135 136   POTASSIUM mmol/L 4.1  4.5 4.2   CHLORIDE mmol/L 101 98 99   CO2 mmol/L 29 29 27   BUN mg/dL 9 14 15   CREATININE mg/dL 0.63 0.77 0.71   EGFR ml/min/1.73sq m 87 75 82   CALCIUM mg/dL 8.7 8.7 9.6   AST U/L  --   --  19   ALT U/L  --   --  20   ALK PHOS U/L  --   --  80   ALBUMIN g/dL  --   --  4.4     Results from last 7 days   Lab Units 05/03/25  0855   URINE CULTURE  60,000-69,000 cfu/ml Proteus mirabilis*

## 2025-05-06 VITALS
DIASTOLIC BLOOD PRESSURE: 77 MMHG | HEART RATE: 65 BPM | WEIGHT: 175.49 LBS | RESPIRATION RATE: 16 BRPM | OXYGEN SATURATION: 92 % | TEMPERATURE: 98.1 F | SYSTOLIC BLOOD PRESSURE: 166 MMHG | BODY MASS INDEX: 29.24 KG/M2 | HEIGHT: 65 IN

## 2025-05-06 PROCEDURE — G0545 PR INHERENT VISIT TO INPT: HCPCS | Performed by: INTERNAL MEDICINE

## 2025-05-06 PROCEDURE — 99232 SBSQ HOSP IP/OBS MODERATE 35: CPT | Performed by: INTERNAL MEDICINE

## 2025-05-06 PROCEDURE — 99239 HOSP IP/OBS DSCHRG MGMT >30: CPT | Performed by: INTERNAL MEDICINE

## 2025-05-06 RX ORDER — HYDROXYZINE HYDROCHLORIDE 25 MG/1
25 TABLET, FILM COATED ORAL EVERY 6 HOURS PRN
Status: DISCONTINUED | OUTPATIENT
Start: 2025-05-06 | End: 2025-05-06 | Stop reason: HOSPADM

## 2025-05-06 RX ORDER — CEFADROXIL 500 MG/1
1000 CAPSULE ORAL EVERY 12 HOURS SCHEDULED
Qty: 24 CAPSULE | Refills: 0 | Status: SHIPPED | OUTPATIENT
Start: 2025-05-06 | End: 2025-05-12

## 2025-05-06 RX ADMIN — Medication 1000 UNITS: at 09:47

## 2025-05-06 RX ADMIN — CYCLOBENZAPRINE HYDROCHLORIDE 10 MG: 10 TABLET, FILM COATED ORAL at 09:47

## 2025-05-06 RX ADMIN — BUSPIRONE HYDROCHLORIDE 15 MG: 5 TABLET ORAL at 09:47

## 2025-05-06 RX ADMIN — FLUTICASONE FUROATE 1 PUFF: 100 POWDER RESPIRATORY (INHALATION) at 09:47

## 2025-05-06 RX ADMIN — ONDANSETRON 4 MG: 2 INJECTION INTRAMUSCULAR; INTRAVENOUS at 09:51

## 2025-05-06 RX ADMIN — HYDROXYZINE HYDROCHLORIDE 25 MG: 25 TABLET, FILM COATED ORAL at 09:54

## 2025-05-06 RX ADMIN — Medication 400 UNITS: at 09:47

## 2025-05-06 RX ADMIN — FLUOXETINE HYDROCHLORIDE 40 MG: 20 CAPSULE ORAL at 09:47

## 2025-05-06 RX ADMIN — LAMOTRIGINE 200 MG: 100 TABLET ORAL at 09:47

## 2025-05-06 RX ADMIN — ENOXAPARIN SODIUM 40 MG: 40 INJECTION SUBCUTANEOUS at 09:47

## 2025-05-06 RX ADMIN — B-COMPLEX W/ C & FOLIC ACID TAB 1 TABLET: TAB at 09:47

## 2025-05-06 RX ADMIN — CEFADROXIL 1000 MG: 500 CAPSULE ORAL at 09:47

## 2025-05-06 NOTE — PLAN OF CARE
Problem: PAIN - ADULT  Goal: Verbalizes/displays adequate comfort level or baseline comfort level  Description: Interventions:- Encourage patient to monitor pain and request assistance- Assess pain using appropriate pain scale- Administer analgesics based on type and severity of pain and evaluate response- Implement non-pharmacological measures as appropriate and evaluate response- Consider cultural and social influences on pain and pain management- Notify physician/advanced practitioner if interventions unsuccessful or patient reports new pain  Outcome: Progressing     Problem: INFECTION - ADULT  Goal: Absence or prevention of progression during hospitalization  Description: INTERVENTIONS:- Assess and monitor for signs and symptoms of infection- Monitor lab/diagnostic results- Monitor all insertion sites, i.e. indwelling lines, tubes, and drains- Monitor endotracheal if appropriate and nasal secretions for changes in amount and color- [place] appropriate cooling/warming therapies per order- Administer medications as ordered- Instruct and encourage patient and family to use good hand hygiene technique- Identify and instruct in appropriate isolation precautions for identified infection/condition  Outcome: Progressing  Goal: Absence of fever/infection during neutropenic period  Description: INTERVENTIONS:- Monitor WBC  Outcome: Progressing     Problem: SAFETY ADULT  Goal: Patient will remain free of falls  Description: INTERVENTIONS:- Educate patient/family on patient safety including physical limitations- Instruct patient to call for assistance with activity - Consult OT/PT to assist with strengthening/mobility - Keep Call bell within reach- Keep bed low and locked with side rails adjusted as appropriate- Keep care items and personal belongings within reach- Initiate and maintain comfort rounds- Make Fall Risk Sign visible to staff- Offer Toileting every 2 Hours, in advance of need- Initiate/Maintain bed alarm-  Obtain necessary fall risk management equipment: - Apply yellow socks and bracelet for high fall risk patients- Consider moving patient to room near nurses station  Outcome: Progressing  Goal: Maintain or return to baseline ADL function  Description: INTERVENTIONS:-  Assess patient's ability to carry out ADLs; assess patient's baseline for ADL function and identify physical deficits which impact ability to perform ADLs (bathing, care of mouth/teeth, toileting, grooming, dressing, etc.)- Assess/evaluate cause of self-care deficits - Assess range of motion- Assess patient's mobility; develop plan if impaired- Assess patient's need for assistive devices and provide as appropriate- Encourage maximum independence but intervene and supervise when necessary- Involve family in performance of ADLs- Assess for home care needs following discharge - Consider OT consult to assist with ADL evaluation and planning for discharge- Provide patient education as appropriate  Outcome: Progressing  Goal: Maintains/Returns to pre admission functional level  Description: INTERVENTIONS:- Perform AM-PAC 6 Click Basic Mobility/ Daily Activity assessment daily.- Set and communicate daily mobility goal to care team and patient/family/caregiver. - Collaborate with rehabilitation services on mobility goals if consulted- Perform Range of Motion 3 times a day.- Reposition patient every 2 hours.- Dangle patient 3 times a day- Stand patient 3 times a day- Ambulate patient 3 times a day- Out of bed to chair 3 times a day - Out of bed for meals 3 times a day- Out of bed for toileting- Record patient progress and toleration of activity level   Outcome: Progressing     Problem: DISCHARGE PLANNING  Goal: Discharge to home or other facility with appropriate resources  Description: INTERVENTIONS:- Identify barriers to discharge w/patient and caregiver- Arrange for needed discharge resources and transportation as appropriate- Identify discharge learning  needs (meds, wound care, etc.)- Arrange for interpretive services to assist at discharge as needed- Refer to Case Management Department for coordinating discharge planning if the patient needs post-hospital services based on physician/advanced practitioner order or complex needs related to functional status, cognitive ability, or social support system  Outcome: Progressing     Problem: Knowledge Deficit  Goal: Patient/family/caregiver demonstrates understanding of disease process, treatment plan, medications, and discharge instructions  Description: Complete learning assessment and assess knowledge base.Interventions:- Provide teaching at level of understanding- Provide teaching via preferred learning methods  Outcome: Progressing     Problem: GENITOURINARY - ADULT  Goal: Maintains or returns to baseline urinary function  Description: INTERVENTIONS:- Assess urinary function- Encourage oral fluids to ensure adequate hydration if ordered- Administer IV fluids as ordered to ensure adequate hydration- Administer ordered medications as needed- Offer frequent toileting- Follow urinary retention protocol if ordered  Outcome: Progressing  Goal: Absence of urinary retention  Description: INTERVENTIONS:- Assess patient’s ability to void and empty bladder- Monitor I/O- Bladder scan as needed- Discuss with physician/AP medications to alleviate retention as needed- Discuss catheterization for long term situations as appropriate  Outcome: Progressing  Goal: Urinary catheter remains patent  Description: INTERVENTIONS:- Assess patency of urinary catheter- If patient has a chronic mcgill, consider changing catheter if non-functioning- Follow guidelines for intermittent irrigation of non-functioning urinary catheter  Outcome: Progressing

## 2025-05-06 NOTE — PLAN OF CARE
Problem: PAIN - ADULT  Goal: Verbalizes/displays adequate comfort level or baseline comfort level  Description: Interventions:- Encourage patient to monitor pain and request assistance- Assess pain using appropriate pain scale- Administer analgesics based on type and severity of pain and evaluate response- Implement non-pharmacological measures as appropriate and evaluate response- Consider cultural and social influences on pain and pain management- Notify physician/advanced practitioner if interventions unsuccessful or patient reports new pain  Outcome: Progressing     Problem: INFECTION - ADULT  Goal: Absence or prevention of progression during hospitalization  Description: INTERVENTIONS:- Assess and monitor for signs and symptoms of infection- Monitor lab/diagnostic results- Monitor all insertion sites, i.e. indwelling lines, tubes, and drains- Monitor endotracheal if appropriate and nasal secretions for changes in amount and color- [place] appropriate cooling/warming therapies per order- Administer medications as ordered- Instruct and encourage patient and family to use good hand hygiene technique- Identify and instruct in appropriate isolation precautions for identified infection/condition  Outcome: Progressing  Goal: Absence of fever/infection during neutropenic period  Description: INTERVENTIONS:- Monitor WBC  Outcome: Progressing     Problem: SAFETY ADULT  Goal: Patient will remain free of falls  Description: INTERVENTIONS:- Educate patient/family on patient safety including physical limitations- Instruct patient to call for assistance with activity - Consult OT/PT to assist with strengthening/mobility - Keep Call bell within reach- Keep bed low and locked with side rails adjusted as appropriate- Keep care items and personal belongings within reach- Initiate and maintain comfort rounds- Make Fall Risk Sign visible to staff- Offer Toileting every 2 Hours, in advance of need- Initiate/Maintain bed alarm-  Obtain necessary fall risk management equipment: - Apply yellow socks and bracelet for high fall risk patients- Consider moving patient to room near nurses station  Outcome: Progressing  Goal: Maintain or return to baseline ADL function  Description: INTERVENTIONS:-  Assess patient's ability to carry out ADLs; assess patient's baseline for ADL function and identify physical deficits which impact ability to perform ADLs (bathing, care of mouth/teeth, toileting, grooming, dressing, etc.)- Assess/evaluate cause of self-care deficits - Assess range of motion- Assess patient's mobility; develop plan if impaired- Assess patient's need for assistive devices and provide as appropriate- Encourage maximum independence but intervene and supervise when necessary- Involve family in performance of ADLs- Assess for home care needs following discharge - Consider OT consult to assist with ADL evaluation and planning for discharge- Provide patient education as appropriate  Outcome: Progressing  Goal: Maintains/Returns to pre admission functional level  Description: INTERVENTIONS:- Perform AM-PAC 6 Click Basic Mobility/ Daily Activity assessment daily.- Set and communicate daily mobility goal to care team and patient/family/caregiver. - Collaborate with rehabilitation services on mobility goals if consulted- Perform Range of Motion 3 times a day.- Reposition patient every 2 hours.- Dangle patient 3 times a day- Stand patient 3 times a day- Ambulate patient 3 times a day- Out of bed to chair 3 times a day - Out of bed for meals 3 times a day- Out of bed for toileting- Record patient progress and toleration of activity level   Outcome: Progressing     Problem: DISCHARGE PLANNING  Goal: Discharge to home or other facility with appropriate resources  Description: INTERVENTIONS:- Identify barriers to discharge w/patient and caregiver- Arrange for needed discharge resources and transportation as appropriate- Identify discharge learning  needs (meds, wound care, etc.)- Arrange for interpretive services to assist at discharge as needed- Refer to Case Management Department for coordinating discharge planning if the patient needs post-hospital services based on physician/advanced practitioner order or complex needs related to functional status, cognitive ability, or social support system  Outcome: Progressing     Problem: Knowledge Deficit  Goal: Patient/family/caregiver demonstrates understanding of disease process, treatment plan, medications, and discharge instructions  Description: Complete learning assessment and assess knowledge base.Interventions:- Provide teaching at level of understanding- Provide teaching via preferred learning methods  Outcome: Progressing     Problem: GENITOURINARY - ADULT  Goal: Maintains or returns to baseline urinary function  Description: INTERVENTIONS:- Assess urinary function- Encourage oral fluids to ensure adequate hydration if ordered- Administer IV fluids as ordered to ensure adequate hydration- Administer ordered medications as needed- Offer frequent toileting- Follow urinary retention protocol if ordered  Outcome: Progressing  Goal: Absence of urinary retention  Description: INTERVENTIONS:- Assess patient’s ability to void and empty bladder- Monitor I/O- Bladder scan as needed- Discuss with physician/AP medications to alleviate retention as needed- Discuss catheterization for long term situations as appropriate  Outcome: Progressing  Goal: Urinary catheter remains patent  Description: INTERVENTIONS:- Assess patency of urinary catheter- If patient has a chronic mcgill, consider changing catheter if non-functioning- Follow guidelines for intermittent irrigation of non-functioning urinary catheter  Outcome: Progressing

## 2025-05-06 NOTE — ASSESSMENT & PLAN NOTE
"H/o recurrent UTI (patient reports since age 1), presented w/ ongoing dysuria, suprapubic pressure and L flank pain.    CT revealed, \"Bilateral renal cortical scarring right greater than left. Nonobstructing right renal calculus. No hydronephrosis or obstructive uropathy. No CT evidence for pyelonephritis. Minimal bladder wall thickening. Cystitis not excluded\"  UCx from 03/2025 w/ ESBL treated w/ Macrobid   UCx w/ Proteus   S/p Zosyn day # 3, transitioned to Duricef 1000 mg q 12 hrs through 05/12 (10 days total)   Ambulatory referral to urology at discharge  Infectious disease inputs noted she will continue Duricef 1000 mg twice daily through 5/12/2025  "

## 2025-05-06 NOTE — ASSESSMENT & PLAN NOTE
Likely due to UTI as above.  No other appreciable source of infection.  Improved.    Continue antibiotics as above

## 2025-05-06 NOTE — PROGRESS NOTES
05/06/25 0800   Clinical Encounter Type   Visited With Patient  (Fr Christianson)   Sacramental Encounters   Sacrament Other Other (Comment)  (blessing given)

## 2025-05-06 NOTE — PROGRESS NOTES
Progress Note - Infectious Disease   Name: Carola Wilkes 75 y.o. female I MRN: [id number]  Unit/Bed#: -01 I Date of Admission: 5/3/2025   Date of Service: 5/6/2025 I Hospital Day: 3    Assessment & Plan  Recurrent UTI  Patient reports UTI since age of 1.  Previously evaluated by urology but no records available.  Recent ESBL E. coli late March treated with oral Macrobid.  Presenting with persistent symptoms.  Clinically stable without formal sepsis criteria.  CT A/P without stone or obstruction.  Urine culture now with Proteus    Continue Duricef 1000 mg PO Q12 to continue through 5/12 to complete 10 days total antibiotics  As patient has relocated to the [facility], may be prudent to reengage with urology follow-up as an outpatient  Leukocytosis  Likely due to UTI as above.  No other appreciable source of infection.  Improved.    Continue antibiotics as above    Ok for discharge from Infectious Disease service perspective.    Antibiotics:  Duricef #2  Antibiotics #4    Subjective   Patient anxious about leaving today due to loneliness she has at home.  Just had meds increased recently by Psychiatry.  Physically she feels better.  No diarrhea.  Had nausea this AM related to anxiety.    Objective :  Temp:  [98.1 °F (36.7 °C)] 98.1 °F (36.7 °C)  HR:  [64-65] 65  BP: (110-166)/(67-77) 166/77  SpO2:  [90 %-92 %] 92 %  O2 Device: None (Room air)    General:  No acute distress  Psychiatric:  Awake and alert  Pulmonary:  Normal respiratory excursion without accessory muscle use  Abdomen:  Soft, nontender  Extremities:  No edema  Skin:  No rashes      Lab Results: I have reviewed the following results:  Results from last 7 days   Lab Units 05/05/25  0431 05/04/25 0459 05/03/25  0927   WBC Thousand/uL 7.31 8.63 14.84*   HEMOGLOBIN g/dL 12.1 12.1 14.0   PLATELETS Thousands/uL 216 205 256     Results from last 7 days   Lab Units 05/05/25  0431 05/04/25 0459 05/03/25  0927   SODIUM mmol/L 139 135 136   POTASSIUM  mmol/L 4.1 4.5 4.2   CHLORIDE mmol/L 101 98 99   CO2 mmol/L 29 29 27   BUN mg/dL 9 14 15   CREATININE mg/dL 0.63 0.77 0.71   EGFR ml/min/1.73sq m 87 75 82   CALCIUM mg/dL 8.7 8.7 9.6   AST U/L  --   --  19   ALT U/L  --   --  20   ALK PHOS U/L  --   --  80   ALBUMIN g/dL  --   --  4.4     Results from last 7 days   Lab Units 05/03/25  0855   URINE CULTURE  60,000-69,000 cfu/ml Proteus mirabilis*

## 2025-05-06 NOTE — DISCHARGE SUMMARY
"Discharge Summary - Hospitalist   Name: Carola Wilkes 75 y.o. female I MRN: [id number]  Unit/Bed#: -01 I Date of Admission: 5/3/2025   Date of Service: 5/6/2025 I Hospital Day: 3     Assessment & Plan  Recurrent UTI  H/o recurrent UTI (patient reports since age 1), presented w/ ongoing dysuria, suprapubic pressure and L flank pain.    CT revealed, \"Bilateral renal cortical scarring right greater than left. Nonobstructing right renal calculus. No hydronephrosis or obstructive uropathy. No CT evidence for pyelonephritis. Minimal bladder wall thickening. Cystitis not excluded\"  UCx from 03/2025 w/ ESBL treated w/ Macrobid   UCx w/ Proteus   S/p Zosyn day # 3, transitioned to Duricef 1000 mg q 12 hrs through 05/12 (10 days total)   Ambulatory referral to urology at discharge  Infectious disease inputs noted she will continue Duricef 1000 mg twice daily through 5/12/2025  Leukocytosis  Recent Labs     05/04/25  0459 05/05/25  0431   WBC 8.63 7.31     Resolved  COPD (chronic obstructive pulmonary disease) ([facility])  Continue respiratory treatments  Bipolar disorder, current episode depressed, severe, without psychotic features ([facility])  C/w PTA BuSpar 50 mg BID, Prozac 40 mg q day, Lamictal 200 mg BID and Geodon 20 mg q day   Mood stable              Discharge Summary - [facility] Internal Medicine    Patient Information: Carola Wilkes 75 y.o. female MRN: [id number]  Unit/Bed#: -01 Encounter: [id number]    Discharging Physician / Practitioner: Brandon Thrasher MD  PCP: SHAWNEE Dubon  Admission Date: 5/3/2025  Discharge Date: 05/06/25    Disposition:     Home    Reason for Admission: Dysuria suprapubic discomfort flank pain.    Discharge Diagnoses:     Principal Problem:    Recurrent UTI  Active Problems:    Bipolar disorder, current episode depressed, severe, without psychotic features ([facility])    COPD (chronic obstructive pulmonary disease) ([facility])    Leukocytosis  Resolved Problems:    * No " "resolved hospital problems. *      Consultations During Hospital Stay:  Infectious disease    Procedures Performed:     CT abdomen pelvis  Bilateral renal cortical scarring right greater than left. Nonobstructing right renal calculus. No hydronephrosis or obstructive uropathy. No CT evidence for pyelonephritis.  Minimal bladder wall thickening. Cystitis not excluded.      Hospital Course:     Carola Wilkes is a 75 y.o. female patient who originally presented to the hospital on 5/3/2025 due to urinary symptoms.  Patient with history of recurrent UTIs seen in consultation with urology.  Initially received IV Zosyn, transitioned to p.o. Duricef 1000 mg twice daily through 5/12/2025.  Patient reports clinical and symptomatic improvement and is deemed ready for discharge today.    Outpatient ambulatory urology follow-up placed in discharge instructions.  Continue the chart for details.    Condition at Discharge: fair     Discharge Day Visit / Exam:     Subjective:      Comfortably in bed  Reports feeling better  Agreeable to discharge plan  History chart labs medications reviewed      Vitals: Blood Pressure: 166/77 (05/06/25 0747)  Pulse: 65 (05/05/25 2221)  Temperature: 98.1 °F (36.7 °C) (05/06/25 0747)  Temp Source: Oral (05/03/25 1425)  Respirations: 16 (05/03/25 1425)  Height: 5' 5\" (165.1 cm) (05/03/25 1425)  Weight - Scale: 79.6 kg (175 lb 7.8 oz) (05/03/25 1425)  SpO2: 92 % (05/05/25 2221)  Exam:   Physical Exam    Comfortably in bed  Neck supple  Lungs diminished breath sounds  Heart sounds S1-S2 noted  Abdomen soft  Awake follows commands  No pedal edema  No rash    Discharge instructions/Information to patient and family:   See after visit summary for information provided to patient and family.      Discharge plan discussed with patient, updated daughter Lorena  Outpatient follow-up with urology, primary care physician    Provisions for Follow-Up Care:  See after visit summary for information related to " follow-up care and any pertinent home health orders.      Planned Readmission: no     Discharge Statement:  I spent 41 minutes discharging the patient. This time was spent on the day of discharge. I had direct contact with the patient on the day of discharge. Greater than 50% of the total time was spent examining patient, answering all patient questions, arranging and discussing plan of care with patient as well as directly providing post-discharge instructions.  Additional time then spent on discharge activities.    Discharge Medications:  See after visit summary for reconciled discharge medications provided to patient and family.      ** Please Note: This note has been constructed using a voice recognition system **

## 2025-05-06 NOTE — ASSESSMENT & PLAN NOTE
Patient reports UTI since age of 1.  Previously evaluated by urology but no records available.  Recent ESBL E. coli late March treated with oral Macrobid.  Presenting with persistent symptoms.  Clinically stable without formal sepsis criteria.  CT A/P without stone or obstruction.  Urine culture now with Proteus    Continue Duricef 1000 mg PO Q12 to continue through 5/12 to complete 10 days total antibiotics  As patient has relocated to the [facility], may be prudent to reengage with urology follow-up as an outpatient

## 2025-05-07 ENCOUNTER — TRANSITIONAL CARE MANAGEMENT (OUTPATIENT)
Dept: FAMILY MEDICINE CLINIC | Facility: CLINIC | Age: 75
End: 2025-05-07

## 2025-05-07 NOTE — UTILIZATION REVIEW
NOTIFICATION OF ADMISSION DISCHARGE   This is a Notification of Discharge from [facility]. Please be advised that this patient has been discharge from our facility. Below you will find the admission and discharge date and time including the patient’s disposition.   UTILIZATION REVIEW CONTACT:  Utilization Review Assistants  Network Utilization Review Department  Phone: [phone] x carefully listen to the prompts. All voicemails are confidential.  Email: [email or web address]     ADMISSION INFORMATION  PRESENTATION DATE: 5/3/2025  8:47 AM  OBERVATION ADMISSION DATE: N/A  INPATIENT ADMISSION DATE: 5/3/25 12:04 PM   DISCHARGE DATE: 5/6/2025 12:41 PM   DISPOSITION:Home/Self Care    Network Utilization Review Department  ATTENTION: Please call with any questions or concerns to [phone] and carefully listen to the prompts so that you are directed to the right person. All voicemails are confidential.   For Discharge needs, contact Care Management DC Support Team at [phone] opt. 2  Send all requests for admission clinical reviews, approved or denied determinations and any other requests to dedicated fax number below belonging to the campus where the patient is receiving treatment. List of dedicated fax numbers for the Facilities:  FACILITY NAME UR FAX NUMBER   ADMISSION DENIALS (Administrative/Medical Necessity) [phone]   DISCHARGE SUPPORT TEAM ([facility]) [phone]   PARENT CHILD HEALTH (Maternity/NICU/Pediatrics) [phone]   [facility] [phone]   [facility] [phone]   [facility] [phone]   [facility] [phone]   [facility] [phone]   [facility] [phone]   [facility] [phone]   [facility] [phone]   [facility]  [facility] [phone]   [facility] [phone]   [facility] [phone]   [facility] [phone]

## 2025-05-19 ENCOUNTER — CONSULT (OUTPATIENT)
Dept: BARIATRICS | Facility: CLINIC | Age: 75
End: 2025-05-19
Payer: MEDICARE

## 2025-05-19 VITALS
WEIGHT: 175.5 LBS | TEMPERATURE: 98 F | HEART RATE: 84 BPM | HEIGHT: 60 IN | SYSTOLIC BLOOD PRESSURE: 110 MMHG | BODY MASS INDEX: 34.46 KG/M2 | DIASTOLIC BLOOD PRESSURE: 76 MMHG

## 2025-05-19 DIAGNOSIS — K91.2 POSTSURGICAL MALABSORPTION: ICD-10-CM

## 2025-05-19 DIAGNOSIS — R63.5 ABNORMAL WEIGHT GAIN: ICD-10-CM

## 2025-05-19 DIAGNOSIS — E78.5 HLD (HYPERLIPIDEMIA): ICD-10-CM

## 2025-05-19 DIAGNOSIS — E66.811 OBESITY, CLASS I, BMI 30-34.9: ICD-10-CM

## 2025-05-19 DIAGNOSIS — Z98.84 BARIATRIC SURGERY STATUS: ICD-10-CM

## 2025-05-19 DIAGNOSIS — Z48.815 ENCOUNTER FOR SURGICAL AFTERCARE FOLLOWING SURGERY OF DIGESTIVE SYSTEM: Primary | ICD-10-CM

## 2025-05-19 PROCEDURE — 99204 OFFICE O/P NEW MOD 45 MIN: CPT | Performed by: NURSE PRACTITIONER

## 2025-05-19 NOTE — PROGRESS NOTES
Date of surgery: 2000  Procedure: SLEEVE  Performing Surgeon: Dr. Torre    Initial Weight - 175 lbs (pt reported)  Current Weight - 175.5 lbs  Eb Weight - 165 lbs  (pt reported)

## 2025-05-19 NOTE — PROGRESS NOTES
Assessment/Plan:     Patient ID: Carola Wilkes is a 75 y.o. female.    OBESITY I/BMI 34/HLD  PLAN:  - Discussed role of weight loss medications. We have discussed metformin, naltrexone and GLP-1 injectables.   - Initial weight loss goal of 5-10% weight loss for improved overall health  - patient has maintained/performed healthy dietary changes and increased physical activity for at least 3 months prior to initiation of AOMs.   - Reviewed Screening labs - lipid panel shows mixed hyperlipidemia.will order the rest of her panel.    Lab Results   Component Value Date    CHOLESTEROL 297 (H) 03/31/2025     Lab Results   Component Value Date    HDL 52 03/31/2025     Lab Results   Component Value Date    TRIG 388 (H) 03/31/2025     Lab Results   Component Value Date    NONHDLC 245 03/31/2025       - Patient is interested in pursuing Zepbound  Discussed medication options  Recommend treatment with Zepbound  Medication Contract Signed   Discussed expected weight loss of approximately 20% along with lifestyle modifications  Discussed risks/side effects of medication and demonstrated pen device  Recommend small/low fat meals and stop eating when full to avoid side effects.  Discussed importance of adequate protein intake and strength training to reduce risk of muscle loss.   Discussed need to stop medication for at least 1 week prior to planned surgery/endoscopy  Denies hx Pancreatitis or FH of Medullary cell Thyroid CA/MEN2 syndrome  Start Zepbound  2.5mg weekly x 4 weeks and titrate.   Advised to contact office in 2 weeks with update regarding tolerability and at that time will increase to 5mg dose if tolerating medication with no significant side effects.         --she will call insurance to check for coverage and for pricing before we start this AOMs. Will let provider know what she is interested in.       - Follow up in approximately 3 months with Non-Surgical Physician/Advanced Practitioner. - she would like to follow  up for [facility] at [place] as this is closer for her.     Goals:  Food log (ie.) [email or web address],[email or web address]. baritastic  No sugary beverages. At least 64oz of water daily.  Increase physical activity by 10 minutes daily. Gradually increase physical activity to a goal of 5 days per week for 30 minutes of MODERATE intensity PLUS 2 days per week of FULL BODY resistance training  5-10 servings of fruits and vegetables per day and 25-35 grams of dietary fiber per day, gradually increasing  No sugary beverages. At least 64oz of water daily., Increase physical activity by 10 minutes daily, Practice lesson plans 1-6 in bariatric manual , Practice 30/60 rule, Gradually increase physical activity to a goal of 5 days per week for 30 minutes of MODERATE intensity PLUS 2 days per week of FULL BODY resistance training, Continue with dietary and behavioral recommendations outlined in bariatric manual, Continue to take recommended bariatric vitamins as directed, Goal protein intake of 60-80 grams per day, 5-10 servings of fruits and vegetables per day, 25-35 grams of dietary fiber per day, and 3830-0883 calories per day       Bariatric Surgery Status/abnormal weight gain    -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 Presents to the office today with OD annual for weight gain. She feels she has a lot of hungers and cravings; has a lot of sweet cravings. Snacking mostly around after/evening time. Not as active - suffers from depression, some days feels she does not want to get of bed. Tolerating a regular diet. Denies having any abdominal pain, N/V/D/C, regurgitation, reflux or dysphagia. Taking regular multivitamins and nexium daily for chronic heartburn.       PLAN:     - obtain UGI to review anatomy to rule out a hiatal hernia. She is not interested in any surgical interventions for now. She did have an EGD and colonoscopy through [facility]. Recommended to get copies or we can try to request EGD to make sure there is no arce's.   - Routine follow up in 1 year for annual visit  - Continue with healthy lifestyle, adequate protein intake of 60 gm, fluid intake of at least 64 oz.   - Continue with MVI daily. Recommended to add extra supplements based on any deficiency.   - Activity as tolerated.   - Labs ordered and will adjust accordingly if any deficiency.   - Follow up with RD and SW as needed.     Continued/Maintain healthy weight loss with good nutrition intakes.  Adequate hydration with at least 64oz. fluid intake.  Follow diet as discussed.  Follow vitamin and mineral recommendations as reviewed with you.  Exercise as tolerated.    Colonoscopy referral made: UTD - due 2023 - recall 10 years  EGD screening - per patient had done with 2023.     Follow-up in 1 YEAR FOR ANNUAL. We kindly ask that your arrive 15 minutes before your scheduled appointment time with your provider to allow our staff to room you, get your vital signs and update your chart.    Get lab work done prior to annual visit. Please call the office if you need a script.  It is recommended to check with your insurance BEFORE getting labs done to make sure they are covered by your policy.      Call our office if you have any problems with abdominal pain especially associated with fever, chills, nausea, vomiting or any other concerns.    All  Post-bariatric surgery patients should be aware that very small quantities of any alcohol can cause impairment and it is very possible not to feel the effect. The effect can be in the system for several hours.  It is also a stomach irritant.     It is advised to AVOID alcohol, Nonsteroidal antiinflammatory drugs (NSAIDS) and nicotine of all forms . Any of these can cause stomach irritation/pain.    Discussed the effects of alcohol on a bariatric patient and the increased impairment risk.     Keep up the good work!     Postsurgical  Malabsorption   -At risk for malabsorption of vitamins/minerals secondary to malabsorption and restriction of intake from bariatric surgery  -NOT Currently taking adequate postop bariatric surgery vitamin supplementation  -Next set of bariatric labs ordered for approximately 2 weeks  -Patient received education about the importance of adhering to a lifelong supplementation regimen to avoid vitamin/mineral deficiencies      Diagnoses and all orders for this visit:    Encounter for surgical aftercare following surgery of digestive system  -     CBC; Future  -     Comprehensive metabolic panel; Future  -     Folate; Future  -     Iron Panel (Includes Ferritin, Iron Sat%, Iron, and TIBC); Future  -     PTH, intact; Future  -     Methylmalonic acid, serum; Future  -     Vitamin A; Future  -     Vitamin B1, whole blood; Future  -     Vitamin B12; Future  -     Vitamin D 25 hydroxy; Future  -     Zinc; Future    Bariatric surgery status  -     CBC; Future  -     Comprehensive metabolic panel; Future  -     Folate; Future  -     Iron Panel (Includes Ferritin, Iron Sat%, Iron, and TIBC); Future  -     PTH, intact; Future  -     Methylmalonic acid, serum; Future  -     Vitamin A; Future  -     Vitamin B1, whole blood; Future  -     Vitamin B12; Future  -     Vitamin D 25 hydroxy; Future  -     Zinc; Future  -     Hemoglobin A1C; Future  -     TSH, 3rd generation with Free T4 reflex; Future  -     Insulin, fasting; Future    Postsurgical malabsorption  -     CBC; Future  -     Comprehensive metabolic panel; Future  -     Folate; Future  -     Iron Panel (Includes Ferritin, Iron Sat%, Iron, and TIBC); Future  -     PTH, intact; Future  -     Methylmalonic acid, serum; Future  -     Vitamin A; Future  -     Vitamin B1, whole blood; Future  -     Vitamin B12; Future  -     Vitamin D 25 hydroxy; Future  -     Zinc; Future  -     Hemoglobin A1C; Future  -     TSH, 3rd generation with Free T4 reflex; Future  -     Insulin, fasting;  Future    Obesity, Class I, BMI 30-34.9  -     CBC; Future  -     Comprehensive metabolic panel; Future  -     Folate; Future  -     Iron Panel (Includes Ferritin, Iron Sat%, Iron, and TIBC); Future  -     PTH, intact; Future  -     Methylmalonic acid, serum; Future  -     Vitamin A; Future  -     Vitamin B1, whole blood; Future  -     Vitamin B12; Future  -     Vitamin D 25 hydroxy; Future  -     Zinc; Future  -     FL upper GI UGI; Future  -     Hemoglobin A1C; Future  -     TSH, 3rd generation with Free T4 reflex; Future  -     Insulin, fasting; Future    BMI 34.0-34.9,adult  -     CBC; Future  -     Comprehensive metabolic panel; Future  -     Folate; Future  -     Iron Panel (Includes Ferritin, Iron Sat%, Iron, and TIBC); Future  -     PTH, intact; Future  -     Methylmalonic acid, serum; Future  -     Vitamin A; Future  -     Vitamin B1, whole blood; Future  -     Vitamin B12; Future  -     Vitamin D 25 hydroxy; Future  -     Zinc; Future  -     FL upper GI UGI; Future  -     Hemoglobin A1C; Future  -     TSH, 3rd generation with Free T4 reflex; Future  -     Insulin, fasting; Future    Abnormal weight gain  -     Hemoglobin A1C; Future  -     TSH, 3rd generation with Free T4 reflex; Future  -     Insulin, fasting; Future    HLD (hyperlipidemia)    Other orders  -     Ambulatory Referral to Weight Management  -     esomeprazole (NexIUM) 20 mg capsule; Take 20 mg by mouth daily in the early morning         Subjective:      Patient ID: Carola Wilkes is a 75 y.o. female.  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. Presents to the office today with OD annual for weight gain. She feels she has a lot of hungers and cravings; has a lot of sweet cravings. Snacking mostly around after/evening time. Not as active - suffers from depression, some days feels she does not want to get of bed. Tolerating a regular diet. Denies having any abdominal pain, N/V/D/C, regurgitation, reflux or  dysphagia. Taking regular multivitamins and nexium daily for chronic heartburn.     Initial:180 LBS   Current: 175.5 LBS   EWL: (Weight loss is ahead of schedule at this post surgical period.)  Eb  170 lbs   Current BMI is Body mass index is 34.27 kg/m².    Tolerating a regular diet-yes  Eating at least 60 grams of protein per day-yes  Following 30/60 minute rule with liquids-no  Drinking at least 64 ounces of fluid per day-yes  Drinking carbonated beverages-yes - regular   Sufficient exercise- limited/small amount.   Using NSAIDs regularly- yes - has chronic pain about QOD  Using nicotine-yes - Half a pack per day.  Using alcohol-socially - Varies  Supplements: regular Multivitamins and FISH OIL, VITAMIN E    Obesity/Excess Weight:   BMI 34.27  Severity: Moderate  Onset:  lifelong    Modifiers: Diet and Exercise and bariatric surgery. Has tried topamax in the past for mood RX by her psychiatrist.   Contributing factors: Poor Food Choices, Insufficient Physical Activity, Lack of knowledge of appropriate lifestyle changes, Medications, Depression, and Insufficient time to make appropriate lifestyle changes  Associated symptoms: comorbid conditions, fatigue, increased joint pain, decreased exercise capacity, body image issues, decreased self esteem, increased shortness of breath, decreased mobility, depression, inability to do certain activities, and clothes do not fit  Colonoscopy-Completed - per patient.     Highest weight  180-185 lbs  Current Weight 175.5 LBS   Eb 170 lbs   Goal -  130 lbs   5% weight loss - 8.8 lbs (166.7 lbs)  20% weight loss - 35.1 LBS (140.4 LBS)        Hydration: 64 OZ OF diet ice tea and   Alcohol: occasionally.   Exercise: walking   Dining out: once a week  Occupation:  retired   Sleep: 8 hours of sleep.   STOPBANG: no REJI    B: Smoothies - frozen fruit with spinach and kale with protein powder, coffee  S: evangelista PBJ or bowl of cereal with 1% milk  L: boost, evangelista MARTINEZ  S: dixie  OR cheese or chips,  D: chicken pot pies, or chili at times meal delivery services  S: nachos, sweets - donuts, cookies, cakes - but this was in spurts so now it is mostly smuckers PB.       Wt Readings from Last 3 Encounters:   05/19/25 79.6 kg (175 lb 8 oz)   05/03/25 79.6 kg (175 lb 7.8 oz)   03/31/25 78.5 kg (173 lb)           Patient is not pregnant/breastfeeding - can consider metformin due to multiple weight gaining profile medications.     Patient denies personal and family history of  pancreatitis, thyroid cancer, MEN-2 tumors.    Denies any hx of glaucoma, seizures, kidney stones, gallstones - s/p cholecystectomy - has tried topamax in the past without effectiveness.     Denies Hx of CAD, PAD, palpitations, arrhythmia, uncontrolled HTN, hyperthyroidism or use of stimulant medications - given age would avoid phentermine.     + anxiety/depression. On multiple weight gaining medication and medications for mood. Avoid wellbutrin Denies suicidal behavior or thinking , insomnia or sleep disturbance.      Denies opioid use.  - can consider naltrexone.       The following portions of the patient's history were reviewed and updated as appropriate: allergies, current medications, past family history, past medical history, past social history, past surgical history and problem list.    Review of Systems   Constitutional:  Positive for activity change, appetite change, fatigue and unexpected weight change.   Respiratory: Negative.     Cardiovascular: Negative.    Gastrointestinal: Negative.    Musculoskeletal:  Positive for arthralgias and back pain.   Neurological:  Positive for numbness (and tingling ot lower extremities).   Psychiatric/Behavioral:  Positive for dysphoric mood.          Objective:    /76   Pulse 84   Temp 98 °F (36.7 °C) (Tympanic)   Ht 5' (1.524 m)   Wt 79.6 kg (175 lb 8 oz)   BMI 34.27 kg/m²      Physical Exam  Vitals and nursing note reviewed.   Constitutional:       Appearance:  Normal appearance. She is obese.     Cardiovascular:      Rate and Rhythm: Normal rate and regular rhythm.      Pulses: Normal pulses.      Heart sounds: Normal heart sounds.   Pulmonary:      Effort: Pulmonary effort is normal.      Breath sounds: Normal breath sounds.   Abdominal:      General: Bowel sounds are normal.      Palpations: Abdomen is soft.      Tenderness: There is no abdominal tenderness.     Musculoskeletal:         General: Normal range of motion.     Skin:     General: Skin is warm and dry.     Neurological:      General: No focal deficit present.      Mental Status: She is alert and oriented to person, place, and time.     Psychiatric:         Mood and Affect: Mood normal.         Behavior: Behavior normal.         Thought Content: Thought content normal.         Judgment: Judgment normal.         I have spent a total time of 50 minutes in caring for this patient on the day of the visit/encounter including Diagnostic results, Prognosis, Risks and benefits of tx options, Instructions for management, Patient and family education, Importance of tx compliance, Risk factor reductions, Impressions, Counseling / Coordination of care, Documenting in the medical record, Reviewing/placing orders in the medical record (including tests, medications, and/or procedures), Obtaining or reviewing history  , and reviewing healthy habits.

## 2025-05-20 ENCOUNTER — TELEPHONE (OUTPATIENT)
Age: 75
End: 2025-05-20

## 2025-05-20 NOTE — TELEPHONE ENCOUNTER
Camelia from [facility] is calling to clarify what is needed. Warm transfer to [facility] for assistance

## 2025-05-20 NOTE — TELEPHONE ENCOUNTER
[facility] called back stating that there is no record of egd or colonscopy... referred to [facility] end[facility] ... P [phone]  no records of egd or colonscopy.  Lmom on patient's voicemail with our fax number when she locates records to be faxed

## 2025-05-20 NOTE — TELEPHONE ENCOUNTER
Patient called in regarding the Ozempic medication. She spoke to her insurance company and was advised they cover this medication but will need a Prior Authorization. She is requesting for a Prior Authorization to be submitted to her insurance so she is able to start the medication.

## 2025-05-22 ENCOUNTER — TELEPHONE (OUTPATIENT)
Age: 75
End: 2025-05-22

## 2025-05-22 NOTE — TELEPHONE ENCOUNTER
Contacted patient to offer VIRTUAL ONLY services through [facility]. Pt would like to discuss with daughter due to not being tech savvy and will call us back.    Upon c/b, please transfer to writer or Hanna THIBODEAUX

## 2025-05-23 ENCOUNTER — TELEPHONE (OUTPATIENT)
Age: 75
End: 2025-05-23

## 2025-05-23 NOTE — TELEPHONE ENCOUNTER
Patient's daughter called and requested to reschedule new pt. Appt for [facility] instead of [place]. Writer reschedule appointment from 5/27 to 6/4.
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
## 2025-05-29 ENCOUNTER — TELEPHONE (OUTPATIENT)
Dept: PSYCHIATRY | Facility: CLINIC | Age: 75
End: 2025-05-29

## 2025-05-29 NOTE — TELEPHONE ENCOUNTER
One week follow up call for New Patient appointment with   Lilibeth Garcia PA-C   on 06/04/2025 was made on 05/29/2025. Writer informed patient of New Patient paperwork needing to be completed 5 days prior to the appointment. Writer confirmed paperwork has been sent via [employer].    Appointment was made on: 05/23/2025

## 2025-06-02 ENCOUNTER — APPOINTMENT (OUTPATIENT)
Dept: LAB | Facility: CLINIC | Age: 75
End: 2025-06-02
Attending: NURSE PRACTITIONER
Payer: COMMERCIAL

## 2025-06-02 ENCOUNTER — HOSPITAL ENCOUNTER (OUTPATIENT)
Dept: RADIOLOGY | Facility: HOSPITAL | Age: 75
Discharge: HOME | End: 2025-06-02
Attending: NURSE PRACTITIONER
Payer: COMMERCIAL

## 2025-06-02 ENCOUNTER — TELEPHONE (OUTPATIENT)
Dept: BARIATRICS | Facility: CLINIC | Age: 75
End: 2025-06-02

## 2025-06-02 DIAGNOSIS — E66.811 OBESITY, CLASS I, BMI 30-34.9: ICD-10-CM

## 2025-06-02 DIAGNOSIS — R63.5 ABNORMAL WEIGHT GAIN: ICD-10-CM

## 2025-06-02 DIAGNOSIS — K91.2 POSTSURGICAL MALABSORPTION: ICD-10-CM

## 2025-06-02 DIAGNOSIS — E66.811 OBESITY, CLASS I, BMI 30-34.9: Primary | ICD-10-CM

## 2025-06-02 DIAGNOSIS — Z98.84 BARIATRIC SURGERY STATUS: ICD-10-CM

## 2025-06-02 DIAGNOSIS — Z48.815 ENCOUNTER FOR SURGICAL AFTERCARE FOLLOWING SURGERY OF DIGESTIVE SYSTEM: ICD-10-CM

## 2025-06-02 LAB
25(OH)D3 SERPL-MCNC: 38.4 NG/ML (ref 30–100)
ALBUMIN SERPL BCG-MCNC: 4.3 G/DL (ref 3.5–5)
ALP SERPL-CCNC: 99 U/L (ref 34–104)
ALT SERPL W P-5'-P-CCNC: 28 U/L (ref 7–52)
ANION GAP SERPL CALCULATED.3IONS-SCNC: 10 MMOL/L (ref 4–13)
AST SERPL W P-5'-P-CCNC: 28 U/L (ref 13–39)
BILIRUB SERPL-MCNC: 0.71 MG/DL (ref 0.2–1)
BUN SERPL-MCNC: 10 MG/DL (ref 5–25)
CALCIUM SERPL-MCNC: 9.3 MG/DL (ref 8.4–10.2)
CHLORIDE SERPL-SCNC: 99 MMOL/L (ref 96–108)
CO2 SERPL-SCNC: 28 MMOL/L (ref 21–32)
CREAT SERPL-MCNC: 0.66 MG/DL (ref 0.6–1.3)
ERYTHROCYTE [DISTWIDTH] IN BLOOD BY AUTOMATED COUNT: 13.4 % (ref 11.6–15.1)
EST. AVERAGE GLUCOSE BLD GHB EST-MCNC: 137 MG/DL
FERRITIN SERPL-MCNC: 39 NG/ML (ref 30–307)
FOLATE SERPL-MCNC: 22 NG/ML
GFR SERPL CREATININE-BSD FRML MDRD: 86 ML/MIN/1.73SQ M
GLUCOSE P FAST SERPL-MCNC: 107 MG/DL (ref 65–99)
HBA1C MFR BLD: 6.4 %
HCT VFR BLD AUTO: 41.2 % (ref 34.8–46.1)
HGB BLD-MCNC: 13.8 G/DL (ref 11.5–15.4)
INSULIN SERPL-ACNC: 13.18 UIU/ML (ref 1.9–23)
IRON SATN MFR SERPL: 18 % (ref 15–50)
IRON SERPL-MCNC: 87 UG/DL (ref 50–212)
MCH RBC QN AUTO: 32.5 PG (ref 26.8–34.3)
MCHC RBC AUTO-ENTMCNC: 33.5 G/DL (ref 31.4–37.4)
MCV RBC AUTO: 97 FL (ref 82–98)
PLATELET # BLD AUTO: 240 THOUSANDS/UL (ref 149–390)
PMV BLD AUTO: 9.9 FL (ref 8.9–12.7)
POTASSIUM SERPL-SCNC: 4.1 MMOL/L (ref 3.5–5.3)
PROT SERPL-MCNC: 7.4 G/DL (ref 6.4–8.4)
PTH-INTACT SERPL-MCNC: 60.9 PG/ML (ref 12–88)
RBC # BLD AUTO: 4.24 MILLION/UL (ref 3.81–5.12)
SODIUM SERPL-SCNC: 137 MMOL/L (ref 135–147)
TIBC SERPL-MCNC: 477.4 UG/DL (ref 250–450)
TRANSFERRIN SERPL-MCNC: 341 MG/DL (ref 203–362)
TSH SERPL DL<=0.05 MIU/L-ACNC: 1.19 UIU/ML (ref 0.45–4.5)
UIBC SERPL-MCNC: 390 UG/DL (ref 155–355)
VIT B12 SERPL-MCNC: 336 PG/ML (ref 180–914)
WBC # BLD AUTO: 9.15 THOUSAND/UL (ref 4.31–10.16)

## 2025-06-02 PROCEDURE — 82728 ASSAY OF FERRITIN: CPT

## 2025-06-02 PROCEDURE — 84443 ASSAY THYROID STIM HORMONE: CPT

## 2025-06-02 PROCEDURE — 83970 ASSAY OF PARATHORMONE: CPT

## 2025-06-02 PROCEDURE — 83036 HEMOGLOBIN GLYCOSYLATED A1C: CPT

## 2025-06-02 PROCEDURE — 36415 COLL VENOUS BLD VENIPUNCTURE: CPT

## 2025-06-02 PROCEDURE — 83540 ASSAY OF IRON: CPT

## 2025-06-02 PROCEDURE — 84630 ASSAY OF ZINC: CPT

## 2025-06-02 PROCEDURE — 83550 IRON BINDING TEST: CPT

## 2025-06-02 PROCEDURE — 82306 VITAMIN D 25 HYDROXY: CPT

## 2025-06-02 PROCEDURE — 84425 ASSAY OF VITAMIN B-1: CPT

## 2025-06-02 PROCEDURE — 83918 ORGANIC ACIDS TOTAL QUANT: CPT

## 2025-06-02 PROCEDURE — 84590 ASSAY OF VITAMIN A: CPT

## 2025-06-02 PROCEDURE — 83525 ASSAY OF INSULIN: CPT

## 2025-06-02 PROCEDURE — 82607 VITAMIN B-12: CPT

## 2025-06-02 PROCEDURE — 80053 COMPREHEN METABOLIC PANEL: CPT

## 2025-06-02 PROCEDURE — 85027 COMPLETE CBC AUTOMATED: CPT

## 2025-06-02 PROCEDURE — 74240 X-RAY XM UPR GI TRC 1CNTRST: CPT

## 2025-06-02 PROCEDURE — 82746 ASSAY OF FOLIC ACID SERUM: CPT

## 2025-06-02 RX ORDER — TIRZEPATIDE 2.5 MG/.5ML
2.5 INJECTION, SOLUTION SUBCUTANEOUS WEEKLY
Refills: 0 | OUTPATIENT
Start: 2025-06-02 | End: 2025-06-30

## 2025-06-02 RX ORDER — TIRZEPATIDE 2.5 MG/.5ML
2.5 INJECTION, SOLUTION SUBCUTANEOUS WEEKLY
Qty: 2 ML | Refills: 0 | Status: SHIPPED | OUTPATIENT
Start: 2025-06-02 | End: 2025-06-04 | Stop reason: ALTCHOICE

## 2025-06-02 NOTE — PROGRESS NOTES
Called pt to review UGI. She reports her surgery was done many years ago. We discussed findings were not typical sleeve gastrectomy. She denies having an ESG. Recommended to see a surgeon but patient currently refuses. She is not interested in any surgical interventions. Would like to pursue GLP-1 injectables as discussed at OV. Will send zeound

## 2025-06-02 NOTE — TELEPHONE ENCOUNTER
PA for Zepbound 2.5mgSUBMITTED to [facility]    via    [x]CMM-KEY: 9 [id number] )  [x]Surescripts-Case ID #   []Availity-Auth ID # NDC #   []Faxed to plan   []Other website   []Phone call Case ID #     [x]PA sent as URGENT    All office notes, labs and other pertaining documents and studies sent. Clinical questions answered. Awaiting determination from insurance company.     Turnaround time for your insurance to make a decision on your Prior Authorization can take 7-21 business days.

## 2025-06-02 NOTE — TELEPHONE ENCOUNTER
Reason for call:   [x] Prior Auth  [] Other:     Caller:  [] Patient  [x] Pharmacy  Name: [id number]  Address: [address] [place] [address]   Callback Number:  [phone]     Medication: tirzepatide (Zepbound) 2.5 mg/0.5 mL auto-injector     Dose/Frequency:  Inject 0.5 mL (2.5 mg total) under the skin once a week for 28 day     Quantity: 2ML    Ordering Provider:   [] PCP/Provider -   [x] Speciality/Provider - SHAWNEE Valerio

## 2025-06-03 PROBLEM — N39.0 RECURRENT UTI: Status: RESOLVED | Noted: 2024-09-05 | Resolved: 2025-06-03

## 2025-06-03 NOTE — TELEPHONE ENCOUNTER
PA for Zepbound 2.5 mg  DENIED    Reason:(Screenshot if applicable)    Yennbu can you please sent Order for Pt, wants to Pay cash Please and thank you .     Message sent to office clinical pool Yes    Denial letter scanned into Media No      We can gladly do an appeal but the process can take about 30-60 days to provide determination. Please have the office staff schedule a Peer to Peer at phone  . If an appeal is truly warranted please have Provider send clinical documentation to the PA department to support the appeal.     **Please follow up with your patient regarding denial and next steps**  Request Reference Number: [id number]. ZEPBOUND INJ 2.5/0.5 is denied due to Plan Exclusion. For further questions, call [phone].

## 2025-06-03 NOTE — TELEPHONE ENCOUNTER
Pt calling to inquire why zepbound was denied as she feels she has good insurance. Reviewed that med was plan exclusion. Pt wondering what else office might be able to prescribe and also plans to follow up with insurance. Routing for review.

## 2025-06-04 ENCOUNTER — OFFICE VISIT (OUTPATIENT)
Dept: PSYCHIATRY | Facility: CLINIC | Age: 75
End: 2025-06-04
Payer: COMMERCIAL

## 2025-06-04 VITALS — WEIGHT: 175.7 LBS | BODY MASS INDEX: 34.5 KG/M2 | HEIGHT: 60 IN

## 2025-06-04 DIAGNOSIS — F31.4 BIPOLAR DISORDER, CURRENT EPISODE DEPRESSED, SEVERE, WITHOUT PSYCHOTIC FEATURES (HCC): Primary | ICD-10-CM

## 2025-06-04 DIAGNOSIS — F41.9 ANXIETY: ICD-10-CM

## 2025-06-04 DIAGNOSIS — E66.811 OBESITY, CLASS I, BMI 30-34.9: Primary | ICD-10-CM

## 2025-06-04 LAB
VIT A SERPL-MCNC: 56.9 UG/DL (ref 22–69.5)
VIT B1 BLD-SCNC: 113.4 NMOL/L (ref 66.5–200)
ZINC SERPL-MCNC: 84 UG/DL (ref 44–115)

## 2025-06-04 PROCEDURE — 90792 PSYCH DIAG EVAL W/MED SRVCS: CPT | Performed by: PHYSICIAN ASSISTANT

## 2025-06-04 RX ORDER — FLUOXETINE 10 MG/1
10 CAPSULE ORAL DAILY
Qty: 90 CAPSULE | Refills: 0 | Status: SHIPPED | OUTPATIENT
Start: 2025-06-04

## 2025-06-04 RX ORDER — ZIPRASIDONE HYDROCHLORIDE 60 MG/1
60 CAPSULE ORAL DAILY
Qty: 90 CAPSULE | Refills: 0 | Status: SHIPPED | OUTPATIENT
Start: 2025-06-04

## 2025-06-04 RX ORDER — TIRZEPATIDE 2.5 MG/.5ML
2.5 INJECTION, SOLUTION SUBCUTANEOUS WEEKLY
Qty: 2 ML | Refills: 0 | Status: SHIPPED | OUTPATIENT
Start: 2025-06-04

## 2025-06-04 RX ORDER — FLUOXETINE HYDROCHLORIDE 40 MG/1
40 CAPSULE ORAL DAILY
Qty: 90 CAPSULE | Refills: 1 | Status: SHIPPED | OUTPATIENT
Start: 2025-06-04

## 2025-06-04 NOTE — ASSESSMENT & PLAN NOTE
Orders:    FLUoxetine (PROzac) 40 MG capsule; Take 1 capsule (40 mg total) by mouth daily    ziprasidone (GEODON) 60 mg capsule; Take 1 capsule (60 mg total) by mouth in the morning.    FLUoxetine (PROzac) 10 mg capsule; Take 1 capsule (10 mg total) by mouth daily START THIS DOSE ON 6/11/2025 and take 1 cap po qd with the 40mg cap for a total of 50mg daily

## 2025-06-04 NOTE — ASSESSMENT & PLAN NOTE
Orders:    FLUoxetine (PROzac) 10 mg capsule; Take 1 capsule (10 mg total) by mouth daily START THIS DOSE ON 6/11/2025 and take 1 cap po qd with the 40mg cap for a total of 50mg daily

## 2025-06-05 ENCOUNTER — TELEPHONE (OUTPATIENT)
Age: 75
End: 2025-06-05

## 2025-06-05 NOTE — TELEPHONE ENCOUNTER
REASON FOR CONVERSATION: Med Change Request    SYMPTOMS: n/a    OTHER HEALTH INFORMATION: PT reports that Zepbound 2.5mg will cost her $1400 and is unable to afford it. Asking if provider has more affordable options.    PROTOCOL DISPOSITION: Information or Advice Only Call    CARE ADVICE PROVIDED: will receive call back, PT verbalized understanding.    PRACTICE FOLLOW-UP: please advise

## 2025-06-06 ENCOUNTER — RESULTS FOLLOW-UP (OUTPATIENT)
Dept: BARIATRICS | Facility: CLINIC | Age: 75
End: 2025-06-06

## 2025-06-06 DIAGNOSIS — E53.8 VITAMIN B12 DEFICIENCY: Primary | ICD-10-CM

## 2025-06-06 LAB — METHYLMALONATE SERPL-SCNC: 234 NMOL/L (ref 0–378)

## 2025-06-06 NOTE — TELEPHONE ENCOUNTER
LVM - Called to go over the provider message regarding the vial options since the pt can't afford to pay for the pens , I left the office number if the pt will like move forward with that option, and sent the pt a my chart message.

## 2025-06-09 ENCOUNTER — TELEPHONE (OUTPATIENT)
Dept: BARIATRICS | Facility: CLINIC | Age: 75
End: 2025-06-09

## 2025-06-09 NOTE — TELEPHONE ENCOUNTER
Spoke to pt review her results and she  verbalized understanding and she will buy  B12  500 mg Vitamins .

## 2025-06-09 NOTE — TELEPHONE ENCOUNTER
----- Message from SHAWNEE Jcak sent at 6/6/2025  9:15 AM EDT -----  Please call pt to let her know her labs are all within limits EXCEPT FOR THE FOLLOWING:     - diabetes level check shows she is prediabetic. I do anticipate that with improvement with her weight this can improve. In the meantime, I would recommend to decrease sweets and carbs. Increase   activity as tolerated.     - iron mildly low. I would encourage foods high in iron sources such as: red meat, seafood, eggs, beans, dark green leafy vegetables, and lentils.     - b12 is mildly low. Please add an additional vitamin b12 500 mcg once daily to your supplements. Low b12 can affect your nerve health. Repeat b12 level in 3 months.     ----- Message -----  From: Lab, Background User  Sent: 6/2/2025  12:51 PM EDT  To: SHAWNEE Valerio

## 2025-06-09 NOTE — TELEPHONE ENCOUNTER
PT called in requesting Zepbound 2.5mg vial be sent to [facility].     Education provided that Zepbound vial is only distributed through [facility]  via mail. Updated PT that script has been sent over.     PT reported concern with giving cash to . Advised PT that she may call Edgar and provide credit card information. PT verbalized understanding and agreeable. Phone number provided.     PT stated that if she is unable to to get Zepbound from [facility], she would be interested in trying pills. PT requesting information. Advised PT that information would come from provider. PT verbalized understanding and agreeable to plan.

## 2025-06-10 ENCOUNTER — TELEPHONE (OUTPATIENT)
Age: 75
End: 2025-06-10

## 2025-06-10 ENCOUNTER — TELEPHONE (OUTPATIENT)
Dept: BARIATRICS | Facility: CLINIC | Age: 75
End: 2025-06-10

## 2025-06-10 NOTE — TELEPHONE ENCOUNTER
Patient called back and requesting a call back. The patient seemed very anxious.     Please return call to [phone]

## 2025-06-10 NOTE — TELEPHONE ENCOUNTER
Called pt to go over with her the [employer] direct message, and inform her she should receive a text message from them and they can then process order for her medication, I let her know that the medication script was already sent, the pt stated that she lost the Message so I told the pt that she can call [facility] customer service and they should be able to help her out and even process the order over the phone.

## 2025-06-10 NOTE — TELEPHONE ENCOUNTER
PA for Zepbound 2.5 mg  DENIED  [facility]     Reason:(Screenshot if applicable)        Message sent to office clinical pool  Yes    Denial letter scanned into Media     We can gladly do an appeal but the process can take about 30-60 days to provide determination. Please have the office staff schedule a Peer to Peer at phone  . If an appeal is truly warranted please have Provider send clinical documentation to the PA department to support the appeal.     **Please follow up with your patient regarding denial and next steps**  Request Reference Number: [id number]. ZEPBOUND INJ 2.5/0.5 is denied due to Plan Exclusion. For further questions, call [phone].

## 2025-06-17 ENCOUNTER — TELEPHONE (OUTPATIENT)
Age: 75
End: 2025-06-17

## 2025-06-17 NOTE — TELEPHONE ENCOUNTER
Patient calling in stating she is not doing any better with the Prozac. Successfully warm transferred to nurse line for further assistance.

## 2025-06-17 NOTE — TELEPHONE ENCOUNTER
Carola called the office to report that she is not feeling any better with the medication increase.  States her sister in law  this morning and she is still feeling very anxious and depressed.  Informed her that increase was just done on  and it is too soon for her to feel the benefit of the increase.  Informed her that it will take a few more weeks.  Carola thanked me for confirming what her retired psychiatrist friend also told her and said she can hang in there.  Safety confirmed.  Carola has follow up on .      Will refer to Lilibeth Garcia for review.

## 2025-06-19 NOTE — TELEPHONE ENCOUNTER
Pt calling in asking for recommendations now that she has been denied for zepbound.     Please advise. Call back # [phone]

## 2025-06-23 DIAGNOSIS — F31.4 BIPOLAR DISORDER, CURRENT EPISODE DEPRESSED, SEVERE, WITHOUT PSYCHOTIC FEATURES (HCC): ICD-10-CM

## 2025-06-23 RX ORDER — TRAZODONE HYDROCHLORIDE 150 MG/1
300 TABLET ORAL
Qty: 180 TABLET | Refills: 3 | Status: SHIPPED | OUTPATIENT
Start: 2025-06-23

## 2025-06-24 ENCOUNTER — PATIENT MESSAGE (OUTPATIENT)
Dept: BARIATRICS | Facility: CLINIC | Age: 75
End: 2025-06-24

## 2025-06-24 ENCOUNTER — TELEPHONE (OUTPATIENT)
Dept: BARIATRICS | Facility: CLINIC | Age: 75
End: 2025-06-24

## 2025-06-24 DIAGNOSIS — E66.811 OBESITY, CLASS I, BMI 30-34.9: Primary | ICD-10-CM

## 2025-06-24 RX ORDER — NALTREXONE HYDROCHLORIDE 50 MG/1
50 TABLET, FILM COATED ORAL DAILY
Qty: 30 TABLET | Refills: 2 | Status: SHIPPED | OUTPATIENT
Start: 2025-06-24

## 2025-06-24 NOTE — PATIENT COMMUNICATION
Patient of Tea calling about her Zepbound.    States she will not be able to afford Zepbound from [facility] either.    She would like to discuss oral options if any are appropriate for her.    Please advise  CB#[phone]

## 2025-06-24 NOTE — TELEPHONE ENCOUNTER
Called the pt to ask her if she would like to try the Naltrexone and I went over the side effect and the instruction on how to take it and the pt understood and will like to start the medication I sent the message to the provider so that she can send the script to the pharmacy.

## 2025-06-25 DIAGNOSIS — F31.4 BIPOLAR DISORDER, CURRENT EPISODE DEPRESSED, SEVERE, WITHOUT PSYCHOTIC FEATURES (HCC): ICD-10-CM

## 2025-06-25 RX ORDER — ZIPRASIDONE HYDROCHLORIDE 60 MG/1
60 CAPSULE ORAL DAILY
Qty: 90 CAPSULE | Refills: 0 | Status: SHIPPED | OUTPATIENT
Start: 2025-06-25

## 2025-06-25 NOTE — TELEPHONE ENCOUNTER
Reason for call:   [x] Refill   [] Prior Auth  [] Other:     Office:   [] PCP/Provider -   [x] Specialty/Provider - Lilibeth Garcia PA-C    Medication: ziprasidone (GEODON) 60 mg capsule     Dose/Frequency: Take 1 capsule (60 mg total) by mouth in the morning.     Quantity: 90 capsule (90 day supply)     Pharmacy: [facility]    [facility]   Does the patient have enough for 3 days?   [] Yes   [] No - Send as HP to POD    Mail Away Pharmacy   Does the patient have enough for 10 days?   [] Yes   [x] No - Send as HP to POD

## 2025-06-25 NOTE — TELEPHONE ENCOUNTER
Rx request from [facility] was received for Carola' Ziprasidone 60mg caps, though this was sent 6/4/2025.  Uncertain if problem with transmission despite EMR indicating receipt was confirmed.  I sent a new Rx for a 90 day supply

## 2025-07-01 ENCOUNTER — OFFICE VISIT (OUTPATIENT)
Dept: PSYCHIATRY | Facility: CLINIC | Age: 75
End: 2025-07-01
Payer: COMMERCIAL

## 2025-07-01 VITALS — WEIGHT: 173.3 LBS | HEIGHT: 60 IN | BODY MASS INDEX: 34.02 KG/M2

## 2025-07-01 DIAGNOSIS — F41.9 ANXIETY: ICD-10-CM

## 2025-07-01 DIAGNOSIS — E66.811 OBESITY, CLASS I, BMI 30-34.9: ICD-10-CM

## 2025-07-01 DIAGNOSIS — F31.4 BIPOLAR DISORDER, CURRENT EPISODE DEPRESSED, SEVERE, WITHOUT PSYCHOTIC FEATURES (HCC): Primary | ICD-10-CM

## 2025-07-01 DIAGNOSIS — F31.4 BIPOLAR DISORDER, CURRENT EPISODE DEPRESSED, SEVERE, WITHOUT PSYCHOTIC FEATURES (HCC): ICD-10-CM

## 2025-07-01 PROCEDURE — 99214 OFFICE O/P EST MOD 30 MIN: CPT | Performed by: PHYSICIAN ASSISTANT

## 2025-07-01 RX ORDER — BUPROPION HYDROCHLORIDE 100 MG/1
100 TABLET ORAL EVERY MORNING
Qty: 90 TABLET | Refills: 0 | Status: SHIPPED | OUTPATIENT
Start: 2025-07-01

## 2025-07-01 NOTE — ASSESSMENT & PLAN NOTE
Orders:    buPROPion (WELLBUTRIN) 100 mg tablet; Take 1 tablet (100 mg total) by mouth every morning    PLAN:  Pt is having the same depression and anxiety intensity, but overall the mood is the major factor.  No recent manic Sxs or panic attacks.  Tx options discussed and Pt has not seen any benefit from the increase in SSRI-- hence from a SE/risk/benefit standpoint, I will reduce it back to 40mg.  I offered Bupropion IR for augmentation with hope of this being temporary and Pt accepts this.  No change in other medications.  Pt declines referral to individual or group based psychotherapies.  She continues to utilize medical cannabis for neuropathy.  Treatment plan done and Pt accepts the plan.
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
## 2025-07-01 NOTE — BH TREATMENT PLAN
"TREATMENT PLAN (Medication Management Only)        [facility]    Name/Date of Birth/MRN:  Carola Wilkes 75 y.o. 1950 MRN: [id number]  Date of Treatment Plan: July 1, 2025  Diagnosis/Diagnoses:   1. Bipolar disorder, current episode depressed, severe, without psychotic features (HCC)    2. Anxiety      Strengths/Personal Resources for Self-Care: \"I'm kind and I'm generous, and people like me.\"  Area/Areas of need (in own words): \"I'm still very depressed, sad, disinterested, lethargic, and I feel joyless \".  1. Long Term Goal:   Maintain mood stability and control of anxiety  Target Date: 6 months - 1/1/2026  Person/Persons responsible for completion of goal: Carola mesfin Mcelroy  2.  Short Term Objective (s) - How will we reach this goal?:   A.  Provider new recommended medication/dosage changes and/or continue medication(s):  Pt is having the same depression and anxiety intensity, but overall the mood is the major factor.  No recent manic Sxs or panic attacks.  Tx options discussed and Pt has not seen any benefit from the increase in SSRI-- hence from a SE/risk/benefit standpoint, I will reduce it back to 40mg.  I offered Bupropion IR for augmentation with hope of this being temporary and Pt accepts this.  No change in other medications.   Pt declines referral to individual or group based psychotherapies.   She continues to utilize medical cannabis for neuropathy.  Treatment plan done and Pt accepts the plan.   Start Bupropion 100mg (1) tab po qAM # 90  D/C Fluoxetine 10mg   Continue   Fluoxetine 40mg (1) cap po qd # 90 R1  Trazodone 150mg (2) tabs po qhs -- Pt given a Rx for Qty 180 R3 by PCP on 6/23/2025  Ziprasidone 60mg (1) cap po qd --Pt has enough from last 2 Rxs  Lamotrigine 200mg (1) tab po bid -- Pt was given a Rx for Qty 200 R1 by PCP on 4/8/2025   Buspirone 15mg (1) tab po bid -- Pt was given a Rx for Qty 200 R1 by PCP on 4/8/2025   Pt to get a  " Methylmalonic Acid serum and Vitamins B12-- per [facility]   Pt to F/U PCP and [facility] clinic for medical issues  Return 8 weeks.  Can call any time sooner prn.    Target Date: 6 months - 1/1/2026  Person/Persons Responsible for Completion of Goal: Edilma   Progress Towards Goals: stable, continuing treatment  Treatment Modality: Medication mgt   Review due 180 days from date of this plan: 6 months - 1/1/2026  Expected length of service: ongoing treatment unless revised  My Physician/PA/NP and I have developed this plan together and I agree to work on the goals and objectives. I understand the treatment goals that were developed for my treatment.  Electronic Signatures: on file (unless signed below)    Lilibeth Garcia PA-C 07/01/25

## 2025-07-02 ENCOUNTER — TELEPHONE (OUTPATIENT)
Dept: PSYCHIATRY | Facility: CLINIC | Age: 75
End: 2025-07-02

## 2025-07-02 NOTE — TELEPHONE ENCOUNTER
Called and spoke to patient looking to schedule 8 week f/u per provider's end of visit notes.     F/u scheduled for 8/25/25 @ 10:00 AM

## 2025-07-03 RX ORDER — NALTREXONE HYDROCHLORIDE 50 MG/1
TABLET, FILM COATED ORAL
Qty: 90 TABLET | OUTPATIENT
Start: 2025-07-03

## 2025-07-06 ENCOUNTER — HOSPITAL ENCOUNTER (EMERGENCY)
Facility: HOSPITAL | Age: 75
Discharge: HOME | End: 2025-07-06
Attending: EMERGENCY MEDICINE | Admitting: EMERGENCY MEDICINE
Payer: COMMERCIAL

## 2025-07-06 ENCOUNTER — APPOINTMENT (EMERGENCY)
Dept: RADIOLOGY | Facility: HOSPITAL | Age: 75
End: 2025-07-06
Payer: COMMERCIAL

## 2025-07-06 VITALS
SYSTOLIC BLOOD PRESSURE: 131 MMHG | OXYGEN SATURATION: 94 % | RESPIRATION RATE: 20 BRPM | HEART RATE: 60 BPM | TEMPERATURE: 97.8 F | DIASTOLIC BLOOD PRESSURE: 66 MMHG

## 2025-07-06 DIAGNOSIS — R07.9 CHEST PAIN: Primary | ICD-10-CM

## 2025-07-06 LAB
2HR DELTA HS TROPONIN: 0 NG/L
ALBUMIN SERPL BCG-MCNC: 4.2 G/DL (ref 3.5–5)
ALP SERPL-CCNC: 89 U/L (ref 34–104)
ALT SERPL W P-5'-P-CCNC: 24 U/L (ref 7–52)
ANION GAP SERPL CALCULATED.3IONS-SCNC: 6 MMOL/L (ref 4–13)
AST SERPL W P-5'-P-CCNC: 20 U/L (ref 13–39)
BASOPHILS # BLD AUTO: 0.05 THOUSANDS/ÂΜL (ref 0–0.1)
BASOPHILS NFR BLD AUTO: 1 % (ref 0–1)
BILIRUB SERPL-MCNC: 0.62 MG/DL (ref 0.2–1)
BUN SERPL-MCNC: 9 MG/DL (ref 5–25)
CALCIUM SERPL-MCNC: 9.2 MG/DL (ref 8.4–10.2)
CARDIAC TROPONIN I PNL SERPL HS: 6 NG/L (ref ?–50)
CARDIAC TROPONIN I PNL SERPL HS: 6 NG/L (ref ?–50)
CHLORIDE SERPL-SCNC: 100 MMOL/L (ref 96–108)
CO2 SERPL-SCNC: 29 MMOL/L (ref 21–32)
CREAT SERPL-MCNC: 0.59 MG/DL (ref 0.6–1.3)
EOSINOPHIL # BLD AUTO: 0.15 THOUSAND/ÂΜL (ref 0–0.61)
EOSINOPHIL NFR BLD AUTO: 2 % (ref 0–6)
ERYTHROCYTE [DISTWIDTH] IN BLOOD BY AUTOMATED COUNT: 13.1 % (ref 11.6–15.1)
GFR SERPL CREATININE-BSD FRML MDRD: 89 ML/MIN/1.73SQ M
GLUCOSE SERPL-MCNC: 119 MG/DL (ref 65–140)
HCT VFR BLD AUTO: 40.5 % (ref 34.8–46.1)
HGB BLD-MCNC: 13.5 G/DL (ref 11.5–15.4)
IMM GRANULOCYTES # BLD AUTO: 0.05 THOUSAND/UL (ref 0–0.2)
IMM GRANULOCYTES NFR BLD AUTO: 1 % (ref 0–2)
LYMPHOCYTES # BLD AUTO: 1.86 THOUSANDS/ÂΜL (ref 0.6–4.47)
LYMPHOCYTES NFR BLD AUTO: 23 % (ref 14–44)
MCH RBC QN AUTO: 32.5 PG (ref 26.8–34.3)
MCHC RBC AUTO-ENTMCNC: 33.3 G/DL (ref 31.4–37.4)
MCV RBC AUTO: 97 FL (ref 82–98)
MONOCYTES # BLD AUTO: 0.6 THOUSAND/ÂΜL (ref 0.17–1.22)
MONOCYTES NFR BLD AUTO: 7 % (ref 4–12)
NEUTROPHILS # BLD AUTO: 5.55 THOUSANDS/ÂΜL (ref 1.85–7.62)
NEUTS SEG NFR BLD AUTO: 66 % (ref 43–75)
NRBC BLD AUTO-RTO: 0 /100 WBCS
PLATELET # BLD AUTO: 256 THOUSANDS/UL (ref 149–390)
PMV BLD AUTO: 9.5 FL (ref 8.9–12.7)
POTASSIUM SERPL-SCNC: 4.2 MMOL/L (ref 3.5–5.3)
PROT SERPL-MCNC: 7.1 G/DL (ref 6.4–8.4)
RBC # BLD AUTO: 4.16 MILLION/UL (ref 3.81–5.12)
SODIUM SERPL-SCNC: 135 MMOL/L (ref 135–147)
WBC # BLD AUTO: 8.26 THOUSAND/UL (ref 4.31–10.16)

## 2025-07-06 PROCEDURE — 96374 THER/PROPH/DIAG INJ IV PUSH: CPT

## 2025-07-06 PROCEDURE — 71046 X-RAY EXAM CHEST 2 VIEWS: CPT

## 2025-07-06 PROCEDURE — 99285 EMERGENCY DEPT VISIT HI MDM: CPT | Performed by: EMERGENCY MEDICINE

## 2025-07-06 PROCEDURE — 96375 TX/PRO/DX INJ NEW DRUG ADDON: CPT

## 2025-07-06 PROCEDURE — 84484 ASSAY OF TROPONIN QUANT: CPT

## 2025-07-06 PROCEDURE — 71275 CT ANGIOGRAPHY CHEST: CPT

## 2025-07-06 PROCEDURE — 80053 COMPREHEN METABOLIC PANEL: CPT

## 2025-07-06 PROCEDURE — 85025 COMPLETE CBC W/AUTO DIFF WBC: CPT

## 2025-07-06 PROCEDURE — 93005 ELECTROCARDIOGRAM TRACING: CPT

## 2025-07-06 PROCEDURE — 99285 EMERGENCY DEPT VISIT HI MDM: CPT

## 2025-07-06 PROCEDURE — 36415 COLL VENOUS BLD VENIPUNCTURE: CPT

## 2025-07-06 RX ORDER — ASPIRIN 81 MG/1
2 TABLET, CHEWABLE ORAL ONCE
Status: COMPLETED | OUTPATIENT
Start: 2025-07-06 | End: 2025-07-06

## 2025-07-06 RX ORDER — ASPIRIN 81 MG/1
1 TABLET, CHEWABLE ORAL ONCE
Status: DISCONTINUED | OUTPATIENT
Start: 2025-07-06 | End: 2025-07-06

## 2025-07-06 RX ORDER — MORPHINE SULFATE 4 MG/ML
4 INJECTION, SOLUTION INTRAMUSCULAR; INTRAVENOUS ONCE
Status: COMPLETED | OUTPATIENT
Start: 2025-07-06 | End: 2025-07-06

## 2025-07-06 RX ORDER — IPRATROPIUM BROMIDE AND ALBUTEROL SULFATE .5; 3 MG/3ML; MG/3ML
1 SOLUTION RESPIRATORY (INHALATION) ONCE
Status: COMPLETED | OUTPATIENT
Start: 2025-07-06 | End: 2025-07-06

## 2025-07-06 RX ORDER — ALBUTEROL SULFATE 2.5 MG/3ML
1 SOLUTION RESPIRATORY (INHALATION) ONCE
Status: COMPLETED | OUTPATIENT
Start: 2025-07-06 | End: 2025-07-06

## 2025-07-06 RX ORDER — HYDROMORPHONE HCL/PF 1 MG/ML
1 SYRINGE (ML) INJECTION ONCE
Status: COMPLETED | OUTPATIENT
Start: 2025-07-06 | End: 2025-07-06

## 2025-07-06 RX ADMIN — MORPHINE SULFATE 4 MG: 4 INJECTION INTRAVENOUS at 13:17

## 2025-07-06 RX ADMIN — IOHEXOL 65 ML: 350 INJECTION, SOLUTION INTRAVENOUS at 14:00

## 2025-07-06 RX ADMIN — HYDROMORPHONE HYDROCHLORIDE 1 MG: 1 INJECTION, SOLUTION INTRAMUSCULAR; INTRAVENOUS; SUBCUTANEOUS at 15:12

## 2025-07-06 NOTE — ED PROVIDER NOTES
"Time reflects when diagnosis was documented in both MDM as applicable and the Disposition within this note       Time User Action Codes Description Comment    7/6/2025  3:06 PM Keny Dejesus Add [R07.9] Chest pain           ED Disposition       ED Disposition   Discharge    Condition   Stable    Date/Time   Sun Jul 6, 2025  3:06 PM    Comment   Carola Wilkes discharge to home/self care.                   Assessment & Plan       Medical Decision Making  Patient is a 75 y.o. female who presents to the ED with chest pain.    Vital signs are stable.  Physical exam as above.    Differential diagnosis included but not limited to ACS, costochondritis, myocarditis, pericarditis, dissection unlikely, PE, pneumonia, pneumothorax, GERD, musculoskeletal strain.     Plan: We will order CBC, CMP, troponin, EKG, chest x-ray, and CT PE study.  Patient will be given morphine for pain control.  Patient was given aspirin by EMS prior to arrival.    View ED course above for further discussion on patient workup.     On review of previous records, patient was seen by psychiatry on 7/1/2025.  Visit note reviewed.    All labs reviewed and utilized in the medical decision making process  All radiology studies independently viewed by me and interpreted by the radiologist.  I reviewed all testing with the patient.     Upon re-evaluation, patient noted improvement of symptoms with medication.    Disposition: Patient discharged in stable condition.  Patient given strict return precautions.  Patient sent with referral for cardiology.      Portions of the record may have been created with voice recognition software. Occasional wrong word or \"sound a like\" substitutions may have occurred due to the inherent limitations of voice recognition software. Read the chart carefully and recognize, using context, where substitutions have occurred.     Amount and/or Complexity of Data Reviewed  Labs: ordered. Decision-making details documented in ED " Course.  Radiology: ordered. Decision-making details documented in ED Course.  ECG/medicine tests:  Decision-making details documented in ED Course.    Risk  Prescription drug management.        ED Course as of 07/06/25 1624   Sun Jul 06, 2025   1126 Blood Pressure: 153/70   1126 Temperature: 97.8 °F (36.6 °C)   1126 Pulse: 78   1126 Respirations: 18   1126 SpO2: 94 %   1130 ECG 12 lead  Procedure Note: EKG  Date/Time: 07/06/25 2:24 PM   Interpreted by: NEHEMIAH CARNEY D.O.  Indications / Diagnosis: Chest pain  ECG reviewed by me, the ED Provider: yes   The EKG demonstrates: normal EKG, normal sinus rhythm, T wave inversion in aVL new from previous  Rhythm: normal sinus  Intervals: normal intervals  Axis: normal axis  QRS/Blocks: normal QRS  ST Changes: No acute ST Changes, no STD/KATLYN.    1203 hs TnI 0hr: 6  Mildly elevated, will draw 2 hour for delta   1247 XR chest 2 views  No acute cardiopulmonary disease.   1329 CBC and differential  Reassuring   1344 Comprehensive metabolic panel(!)  Reassuring   1426 hs TnI 2hr: 6  Reassuring   1459 CTA chest pe study  No pulmonary embolus.     Enlargement of central pulmonary arterial tree suggesting pulmonary hypertension.     No acute CT findings to account for the patient's symptoms.   1504 Patient reevaluated, patient notes morphine typically does not help with her pain. She states that dilaudid normally does though. Patient asking to leave after dilaudid dose.       Medications   albuterol (FOR EMS ONLY) (2.5 mg/3 mL) 0.083 % inhalation solution 2.5 mg (0 mg Does not apply Given to EMS 7/6/25 1103)   ipratropium-albuterol (FOR EMS ONLY) (DUO-NEB) 0.5-2.5 mg/3 mL inhalation solution 3 mL (0 mL Does not apply Given to EMS 7/6/25 1103)   aspirin (FOR EMS ONLY) chewable tablet 162 mg (0 mg Does not apply Given to EMS 7/6/25 1103)   aspirin (FOR EMS ONLY) chewable tablet 162 mg (0 mg Does not apply Given to EMS 7/6/25 1104)   morphine injection 4 mg (4 mg Intravenous Given  7/6/25 1317)   iohexol (OMNIPAQUE) 350 MG/ML injection (MULTI-DOSE) 65 mL (65 mL Intravenous Given 7/6/25 1400)   HYDROmorphone (DILAUDID) injection 1 mg (1 mg Intravenous Given 7/6/25 1512)       ED Risk Strat Scores   HEART Risk Score      Flowsheet Row Most Recent Value   Heart Score Risk Calculator    History 1 Filed at: 07/06/2025 1506   ECG 1 Filed at: 07/06/2025 1506   Age 2 Filed at: 07/06/2025 1506   Risk Factors 1 Filed at: 07/06/2025 1506   Troponin 0 Filed at: 07/06/2025 1506   HEART Score 5 Filed at: 07/06/2025 1506          HEART Risk Score      Flowsheet Row Most Recent Value   Heart Score Risk Calculator    History 1 Filed at: 07/06/2025 1506   ECG 1 Filed at: 07/06/2025 1506   Age 2 Filed at: 07/06/2025 1506   Risk Factors 1 Filed at: 07/06/2025 1506   Troponin 0 Filed at: 07/06/2025 1506   HEART Score 5 Filed at: 07/06/2025 1506                      No data recorded        SBIRT 20yo+      Flowsheet Row Most Recent Value   Initial Alcohol Screen: US AUDIT-C     1. How often do you have a drink containing alcohol? 0 Filed at: 07/06/2025 1058   2. How many drinks containing alcohol do you have on a typical day you are drinking?  0 Filed at: 07/06/2025 1058   3a. Male UNDER 65: How often do you have five or more drinks on one occasion? 0 Filed at: 07/06/2025 1058   3b. FEMALE Any Age, or MALE 65+: How often do you have 4 or more drinks on one occassion? 0 Filed at: 07/06/2025 1058   Audit-C Score 0 Filed at: 07/06/2025 1058   TAI: How many times in the past year have you...    Used an illegal drug or used a prescription medication for non-medical reasons? Never Filed at: 07/06/2025 1058                            History of Present Illness       Chief Complaint   Patient presents with    Chest Pain     Pt brought in by EMS from home. Pt reports cp x2 weeks, went away but came back yesterday, feels like someone is sitting on her chest. Reports headache, nausea and weakness. 324 asa given en route. Hx  double mastectomy        Past Medical History[1]   Past Surgical History[2]   Family History[3]   Social History[4]   E-Cigarette/Vaping    E-Cigarette Use Current Some Day User       E-Cigarette/Vaping Substances    Nicotine No     THC Yes     CBD No     Flavoring No     Other No     Unknown No       I have reviewed and agree with the history as documented.     The patient is a 75-year-old female with a past medical history of bilateral breast cancer status post bilateral mastectomy, COPD, bipolar disorder who presents today for chest pain.  Patient states that pain started approximately 2 weeks ago.  Patient states that it has been intermittent since then.  Patient notes that the pain resolved a few days ago, but started again yesterday afternoon.  Patient states that she has never had this type of pain before.  Patient notes that she has had some nausea, but no vomiting.  Patient denies any diaphoresis, or radiation of the pain.  Patient states the pain feels like a pressure in the center of her chest.  Patient notes some mild shortness of breath.  Patient denies any fevers, chills, abdominal pain, vomiting, diarrhea, constipation, headache, vision changes, extremity weakness.        Review of Systems   Constitutional:  Negative for chills and fever.   HENT:  Negative for ear pain and sore throat.    Eyes:  Negative for pain and visual disturbance.   Respiratory:  Positive for shortness of breath. Negative for cough.    Cardiovascular:  Positive for chest pain. Negative for palpitations.   Gastrointestinal:  Positive for nausea. Negative for abdominal pain, constipation, diarrhea and vomiting.   Genitourinary:  Negative for dysuria and hematuria.   Musculoskeletal:  Negative for arthralgias and back pain.   Skin:  Negative for color change and rash.   Neurological:  Negative for seizures and syncope.   All other systems reviewed and are negative.          Objective       ED Triage Vitals [07/06/25 1059]    Temperature Pulse Blood Pressure Respirations SpO2 Patient Position - Orthostatic VS   97.8 °F (36.6 °C) 78 153/70 18 94 % Lying      Temp Source Heart Rate Source BP Location FiO2 (%) Pain Score    Oral Monitor Right arm -- 8      Vitals      Date and Time Temp Pulse SpO2 Resp BP Pain Score FACES Pain Rating User   07/06/25 1500 -- 60 94 % 20 131/66 -- -- AS   07/06/25 1431 -- -- -- -- -- 6 -- AS   07/06/25 1430 -- -- -- -- -- 6 -- AS   07/06/25 1400 -- 64 94 % 21 -- -- -- AS   07/06/25 1330 -- 64 92 % 18 130/76 -- -- AS   07/06/25 1317 -- -- -- -- -- 7 -- AS   07/06/25 1130 -- 68 94 % 17 144/75 -- -- XAVIER   07/06/25 1059 97.8 °F (36.6 °C) 78 94 % 18 153/70 8 -- XAVIER            Physical Exam  Vitals and nursing note reviewed.   Constitutional:       General: She is not in acute distress.     Appearance: She is obese. She is ill-appearing (Chronically).   HENT:      Head: Normocephalic and atraumatic.      Nose: Nose normal.      Mouth/Throat:      Mouth: Mucous membranes are moist.      Pharynx: Oropharynx is clear.     Eyes:      Extraocular Movements: Extraocular movements intact.      Conjunctiva/sclera: Conjunctivae normal.      Pupils: Pupils are equal, round, and reactive to light.       Cardiovascular:      Rate and Rhythm: Normal rate and regular rhythm.      Pulses: Normal pulses.      Heart sounds: Normal heart sounds. No murmur heard.     No friction rub. No gallop.   Pulmonary:      Effort: Pulmonary effort is normal. No respiratory distress.      Breath sounds: Normal breath sounds. No stridor. No wheezing, rhonchi or rales.   Chest:      Chest wall: Tenderness (Mild) present.   Abdominal:      General: Abdomen is flat. There is no distension.      Palpations: Abdomen is soft. There is no mass.      Tenderness: There is no abdominal tenderness. There is no guarding or rebound.     Musculoskeletal:         General: No swelling. Normal range of motion.      Cervical back: Normal range of motion and neck  supple.     Skin:     General: Skin is warm and dry.      Capillary Refill: Capillary refill takes less than 2 seconds.     Neurological:      General: No focal deficit present.      Mental Status: She is alert and oriented to person, place, and time.     Psychiatric:         Mood and Affect: Mood normal.         Results Reviewed       Procedure Component Value Units Date/Time    HS Troponin I 2hr [[id number]]  (Normal) Collected: 07/06/25 1348    Lab Status: Final result Specimen: Blood from Arm, Left Updated: 07/06/25 1422     hs TnI 2hr 6 ng/L      Delta 2hr hsTnI 0 ng/L     Comprehensive metabolic panel [[id number]]  (Abnormal) Collected: 07/06/25 1315    Lab Status: Final result Specimen: Blood from Arm, Left Updated: 07/06/25 1344     Sodium 135 mmol/L      Potassium 4.2 mmol/L      Chloride 100 mmol/L      CO2 29 mmol/L      ANION GAP 6 mmol/L      BUN 9 mg/dL      Creatinine 0.59 mg/dL      Glucose 119 mg/dL      Calcium 9.2 mg/dL      AST 20 U/L      ALT 24 U/L      Alkaline Phosphatase 89 U/L      Total Protein 7.1 g/dL      Albumin 4.2 g/dL      Total Bilirubin 0.62 mg/dL      eGFR 89 ml/min/1.73sq m     Narrative:      [employer] guidelines for Chronic Kidney Disease (CKD):     Stage 1 with normal or high GFR (GFR > 90 mL/min/1.73 square meters)    Stage 2 Mild CKD (GFR = 60-89 mL/min/1.73 square meters)    Stage 3A Moderate CKD (GFR = 45-59 mL/min/1.73 square meters)    Stage 3B Moderate CKD (GFR = 30-44 mL/min/1.73 square meters)    Stage 4 Severe CKD (GFR = 15-29 mL/min/1.73 square meters)    Stage 5 End Stage CKD (GFR <15 mL/min/1.73 square meters)  Note: GFR calculation is accurate only with a steady state creatinine    CBC and differential [[id number]] Collected: 07/06/25 1315    Lab Status: Final result Specimen: Blood from Arm, Left Updated: 07/06/25 1324     WBC 8.26 Thousand/uL      RBC 4.16 Million/uL      Hemoglobin 13.5 g/dL      Hematocrit 40.5 %      MCV 97 fL      MCH  32.5 pg      MCHC 33.3 g/dL      RDW 13.1 %      MPV 9.5 fL      Platelets 256 Thousands/uL      nRBC 0 /100 WBCs      Segmented % 66 %      Immature Grans % 1 %      Lymphocytes % 23 %      Monocytes % 7 %      Eosinophils Relative 2 %      Basophils Relative 1 %      Absolute Neutrophils 5.55 Thousands/µL      Absolute Immature Grans 0.05 Thousand/uL      Absolute Lymphocytes 1.86 Thousands/µL      Absolute Monocytes 0.60 Thousand/µL      Eosinophils Absolute 0.15 Thousand/µL      Basophils Absolute 0.05 Thousands/µL     HS Troponin 0hr (reflex protocol) [[id number]]  (Normal) Collected: 07/06/25 1124    Lab Status: Final result Specimen: Blood from Arm, Left Updated: 07/06/25 1200     hs TnI 0hr 6 ng/L             CTA chest pe study   Final Interpretation by Jett Russo MD (07/06 6247)      No pulmonary embolus.      Enlargement of central pulmonary arterial tree suggesting pulmonary hypertension.      No acute CT findings to account for the patient's symptoms.                  Computerized Assisted Algorithm (CAA) may have aided analysis of applicable images.      Workstation performed: [id number]         XR chest 2 views   Final Interpretation by Julieta Almodovar MD (07/06 1246)      No acute cardiopulmonary disease.            Workstation performed: [id number]             Procedures    ED Medication and Procedure Management   Prior to Admission Medications   Prescriptions Last Dose Informant Patient Reported? Taking?   Acetaminophen 500 MG   Yes No   Sig: Take 1,000 mg by mouth every 6 (six) hours as needed   FLUoxetine (PROzac) 10 mg capsule   No No   Sig: Take 1 capsule (10 mg total) by mouth daily START THIS DOSE ON 6/11/2025 and take 1 cap po qd with the 40mg cap for a total of 50mg daily   FLUoxetine (PROzac) 40 MG capsule   No No   Sig: Take 1 capsule (40 mg total) by mouth daily   Multiple Vitamin (MULTI VITAMIN DAILY PO)   Yes No   Sig: Take 1 tablet by mouth in the morning.   Omega-3 Fatty  Acids (Omega-3 Fish Oil) 1000 MG CAPS   Yes No   Si,000 mg in the morning.   Vitamin E 400 units TABS   Yes No   Sig: Take 400 Units by mouth in the morning.   albuterol (Ventolin HFA) 90 mcg/act inhaler   No No   Sig: Inhale 2 puffs every 6 (six) hours as needed for wheezing   beclomethasone (Qvar RediHaler) 80 MCG/ACT inhaler   No No   Sig: Inhale 2 puffs 2 (two) times a day Rinse mouth after use.   buPROPion (WELLBUTRIN) 100 mg tablet   No No   Sig: Take 1 tablet (100 mg total) by mouth every morning   busPIRone (BUSPAR) 15 mg tablet   No No   Sig: TAKE 1 TABLET BY MOUTH TWICE  DAILY   esomeprazole (NexIUM) 20 mg capsule   Yes No   Sig: Take 20 mg by mouth daily in the early morning   ipratropium-albuterol (DUO-NEB) 0.5-2.5 mg/3 mL nebulizer solution   Yes No   Sig: Inhale 3 mL   Patient not taking: Reported on 5/3/2025   lamoTRIgine (LaMICtal) 200 MG tablet   No No   Sig: TAKE 1 TABLET BY MOUTH TWICE  DAILY   naltrexone (REVIA) 50 mg tablet   No No   Sig: Take 1 tablet (50 mg total) by mouth daily HALF A TABLET FOR ONE WEEK THEN INCREASE TO FULL TABLET   rosuvastatin (CRESTOR) 10 MG tablet   No No   Sig: Take 1 tablet (10 mg total) by mouth daily   traZODone (DESYREL) 150 mg tablet   No No   Sig: TAKE 2 TABLETS BY MOUTH DAILY AT BEDTIME   ziprasidone (GEODON) 60 mg capsule   No No   Sig: Take 1 capsule (60 mg total) by mouth in the morning.      Facility-Administered Medications: None     Discharge Medication List as of 2025  3:08 PM        CONTINUE these medications which have NOT CHANGED    Details   Acetaminophen 500 MG Take 1,000 mg by mouth every 6 (six) hours as needed, Starting Sat 3/23/2024, Historical Med      albuterol (Ventolin HFA) 90 mcg/act inhaler Inhale 2 puffs every 6 (six) hours as needed for wheezing, Starting Fri 11/15/2024, Normal      beclomethasone (Qvar RediHaler) 80 MCG/ACT inhaler Inhale 2 puffs 2 (two) times a day Rinse mouth after use., Starting Fri 11/15/2024, Normal       buPROPion (WELLBUTRIN) 100 mg tablet Take 1 tablet (100 mg total) by mouth every morning, Starting Tue 7/1/2025, Normal      busPIRone (BUSPAR) 15 mg tablet TAKE 1 TABLET BY MOUTH TWICE  DAILY, Starting Thu 4/24/2025, Normal      esomeprazole (NexIUM) 20 mg capsule Take 20 mg by mouth daily in the early morning, Historical Med      !! FLUoxetine (PROzac) 10 mg capsule Take 1 capsule (10 mg total) by mouth daily START THIS DOSE ON 6/11/2025 and take 1 cap po qd with the 40mg cap for a total of 50mg daily, Starting Wed 6/4/2025, Normal      !! FLUoxetine (PROzac) 40 MG capsule Take 1 capsule (40 mg total) by mouth daily, Starting Wed 6/4/2025, Normal      ipratropium-albuterol (DUO-NEB) 0.5-2.5 mg/3 mL nebulizer solution Inhale 3 mL, Historical Med      lamoTRIgine (LaMICtal) 200 MG tablet TAKE 1 TABLET BY MOUTH TWICE  DAILY, Starting Tue 4/8/2025, Normal      Multiple Vitamin (MULTI VITAMIN DAILY PO) Take 1 tablet by mouth in the morning., Historical Med      naltrexone (REVIA) 50 mg tablet Take 1 tablet (50 mg total) by mouth daily HALF A TABLET FOR ONE WEEK THEN INCREASE TO FULL TABLET, Starting Tue 6/24/2025, Normal      Omega-3 Fatty Acids (Omega-3 Fish Oil) 1000 MG CAPS 1,000 mg in the morning., Historical Med      rosuvastatin (CRESTOR) 10 MG tablet Take 1 tablet (10 mg total) by mouth daily, Starting Wed 4/2/2025, Normal      traZODone (DESYREL) 150 mg tablet TAKE 2 TABLETS BY MOUTH DAILY AT BEDTIME, Starting Mon 6/23/2025, Normal      Vitamin E 400 units TABS Take 400 Units by mouth in the morning., Historical Med      ziprasidone (GEODON) 60 mg capsule Take 1 capsule (60 mg total) by mouth in the morning., Starting Wed 6/25/2025, Normal       !! - Potential duplicate medications found. Please discuss with provider.          ED SEPSIS DOCUMENTATION   Time reflects when diagnosis was documented in both MDM as applicable and the Disposition within this note       Time User Action Codes Description Comment     7/6/2025  3:06 PM Keny Dejesus [R07.9] Chest pain                    [1]   Past Medical History:  Diagnosis Date    Psychiatric disorder    [2] No past surgical history on file.  [3]   Family History  Problem Relation Name Age of Onset    Depression Mother      Depression Sister      OCD Son      Anxiety disorder Daughter      Alcohol abuse Daughter     [4]   Social History  Tobacco Use    Smoking status: Some Days     Average packs/day: 0.3 packs/day for 19.0 years (4.8 ttl pk-yrs)     Types: Cigarettes     Start date: 2005   Vaping Use    Vaping status: Some Days    Substances: THC   Substance Use Topics    Alcohol use: Not Currently    Drug use: Never        Keny Dejesus DO  07/06/25 1626

## 2025-07-06 NOTE — ED ATTENDING ATTESTATION
7/6/2025  I, Bernard Ziegler MD, saw and evaluated the patient. I have discussed the patient with the resident/non-physician practitioner and agree with the resident's/non-physician practitioner's findings, Plan of Care, and MDM as documented in the resident's/non-physician practitioner's note, except where noted. All available labs and Radiology studies were reviewed.  I was present for key portions of any procedure(s) performed by the resident/non-physician practitioner and I was immediately available to provide assistance.       At this point I agree with the current assessment done in the Emergency Department.  I have conducted an independent evaluation of this patient a history and physical is as follows:    ED Course   Emergency Department Note- Carola Wilkes 75 y.o. female MRN: [id number]    Unit/Bed#: [facility] Encounter: [id number]    Carola Wilkes is a 75 y.o. female who presents with   Chief Complaint   Patient presents with    Chest Pain     Pt brought in by EMS from home. Pt reports cp x2 weeks, went away but came back yesterday, feels like someone is sitting on her chest. Reports headache, nausea and weakness. 324 asa given en route. Hx double mastectomy          History of Present Illness   HPI:  Carola Wilkes is a 75 y.o. female who presents for evaluation of:  Acute central chest discomfort.  Patient has been having the central chest discomfort on and off intermittently over the last 2 weeks.  The chest discomfort is a chest heaviness sensation.  The episodes last for minutes to hours and then resolved.  The symptoms do not radiate to her left shoulder or her back.  Also notes some headache and some nausea.  She has a history of bilateral mastectomy for breast cancer for which she is in remission.  She denies history of venous thromboembolism.    Review of Systems   Constitutional:  Negative for fatigue and fever.   HENT:  Negative for congestion and sore throat.    Respiratory:  Negative for  cough and shortness of breath.    Cardiovascular:  Negative for chest pain and palpitations.   Gastrointestinal:  Positive for nausea. Negative for abdominal pain and vomiting.   Genitourinary:  Negative for flank pain and frequency.   Neurological:  Negative for light-headedness and headaches.   Psychiatric/Behavioral:  Negative for dysphoric mood and hallucinations.    All other systems reviewed and are negative.      Historical Information   Past Medical History[1]  Past Surgical History[2]  Social History   Social History     Substance and Sexual Activity   Alcohol Use Not Currently     Social History     Substance and Sexual Activity   Drug Use Never     Tobacco Use History[3]  Family History: Family History[4]    Meds/Allergies   PTA meds:   Prior to Admission Medications   Prescriptions Last Dose Informant Patient Reported? Taking?   Acetaminophen 500 MG   Yes No   Sig: Take 1,000 mg by mouth every 6 (six) hours as needed   FLUoxetine (PROzac) 10 mg capsule   No No   Sig: Take 1 capsule (10 mg total) by mouth daily START THIS DOSE ON 2025 and take 1 cap po qd with the 40mg cap for a total of 50mg daily   FLUoxetine (PROzac) 40 MG capsule   No No   Sig: Take 1 capsule (40 mg total) by mouth daily   Multiple Vitamin (MULTI VITAMIN DAILY PO)   Yes No   Sig: Take 1 tablet by mouth in the morning.   Omega-3 Fatty Acids (Omega-3 Fish Oil) 1000 MG CAPS   Yes No   Si,000 mg in the morning.   Vitamin E 400 units TABS   Yes No   Sig: Take 400 Units by mouth in the morning.   albuterol (Ventolin HFA) 90 mcg/act inhaler   No No   Sig: Inhale 2 puffs every 6 (six) hours as needed for wheezing   beclomethasone (Qvar RediHaler) 80 MCG/ACT inhaler   No No   Sig: Inhale 2 puffs 2 (two) times a day Rinse mouth after use.   buPROPion (WELLBUTRIN) 100 mg tablet   No No   Sig: Take 1 tablet (100 mg total) by mouth every morning   busPIRone (BUSPAR) 15 mg tablet   No No   Sig: TAKE 1 TABLET BY MOUTH TWICE  DAILY    esomeprazole (NexIUM) 20 mg capsule   Yes No   Sig: Take 20 mg by mouth daily in the early morning   ipratropium-albuterol (DUO-NEB) 0.5-2.5 mg/3 mL nebulizer solution   Yes No   Sig: Inhale 3 mL   Patient not taking: Reported on 5/3/2025   lamoTRIgine (LaMICtal) 200 MG tablet   No No   Sig: TAKE 1 TABLET BY MOUTH TWICE  DAILY   naltrexone (REVIA) 50 mg tablet   No No   Sig: Take 1 tablet (50 mg total) by mouth daily HALF A TABLET FOR ONE WEEK THEN INCREASE TO FULL TABLET   rosuvastatin (CRESTOR) 10 MG tablet   No No   Sig: Take 1 tablet (10 mg total) by mouth daily   traZODone (DESYREL) 150 mg tablet   No No   Sig: TAKE 2 TABLETS BY MOUTH DAILY AT BEDTIME   ziprasidone (GEODON) 60 mg capsule   No No   Sig: Take 1 capsule (60 mg total) by mouth in the morning.      Facility-Administered Medications: None     Allergies[5]    Objective   First Vitals:   Blood Pressure: 153/70 (07/06/25 1059)  Pulse: 78 (07/06/25 1059)  Temperature: 97.8 °F (36.6 °C) (07/06/25 1059)  Temp Source: Oral (07/06/25 1059)  Respirations: 18 (07/06/25 1059)  SpO2: 94 % (07/06/25 1059)    Current Vitals:   Blood Pressure: 131/66 (07/06/25 1500)  Pulse: 60 (07/06/25 1500)  Temperature: 97.8 °F (36.6 °C) (07/06/25 1059)  Temp Source: Oral (07/06/25 1059)  Respirations: 20 (07/06/25 1500)  SpO2: 94 % (07/06/25 1500)    No intake or output data in the 24 hours ending 07/06/25 1601    Invasive Devices       None                   Physical Exam  Vitals and nursing note reviewed.   Constitutional:       General: She is not in acute distress.     Appearance: Normal appearance. She is well-developed.   HENT:      Head: Normocephalic and atraumatic.      Right Ear: External ear normal.      Left Ear: External ear normal.      Nose: Nose normal.      Mouth/Throat:      Pharynx: No oropharyngeal exudate.     Eyes:      Conjunctiva/sclera: Conjunctivae normal.      Pupils: Pupils are equal, round, and reactive to light.       Cardiovascular:      Rate  "and Rhythm: Normal rate and regular rhythm.   Pulmonary:      Effort: Pulmonary effort is normal. No respiratory distress.   Abdominal:      General: Abdomen is flat. There is no distension.      Palpations: Abdomen is soft.     Musculoskeletal:         General: No deformity. Normal range of motion.      Cervical back: Normal range of motion and neck supple.     Skin:     General: Skin is warm and dry.      Capillary Refill: Capillary refill takes less than 2 seconds.     Neurological:      General: No focal deficit present.      Mental Status: She is alert and oriented to person, place, and time. Mental status is at baseline.      Coordination: Coordination normal.     Psychiatric:         Mood and Affect: Mood normal.         Behavior: Behavior normal.         Thought Content: Thought content normal.         Judgment: Judgment normal.           Medical Decision Makin.  Acute chest discomfort:Complete blood count obtained to assess for leukocytosis and anemia; Basic Metabolic profile obtained to assess for electrolyte disturbance, uremia, and disorders of glucose metabolism; electrocardiogram obtained to assess for arrhythmia; Troponin obtained to assess for myocardial infarction and myocarditis.  CT scan chest rule out pulmonary embolism; chest x-ray rule out pneumonia and pneumothorax.          Recent Results (from the past 36 hours)   ECG 12 lead    Collection Time: 25 11:02 AM   Result Value Ref Range    Ventricular Rate 68 BPM    Atrial Rate 68 BPM    VT Interval 156 ms    QRSD Interval 80 ms    QT Interval 442 ms    QTC Interval 469 ms    P Axis 49 degrees    QRS Axis 24 degrees    T Wave [place] 86 degrees   HS Troponin 0hr (reflex protocol)    Collection Time: 25 11:24 AM   Result Value Ref Range    hs TnI 0hr 6 \"Refer to ACS Flowchart\"- see link ng/L   CBC and differential    Collection Time: 25  1:15 PM   Result Value Ref Range    WBC 8.26 4.31 - 10.16 Thousand/uL    RBC 4.16 3.81 - " "5.12 Million/uL    Hemoglobin 13.5 11.5 - 15.4 g/dL    Hematocrit 40.5 34.8 - 46.1 %    MCV 97 82 - 98 fL    MCH 32.5 26.8 - 34.3 pg    MCHC 33.3 31.4 - 37.4 g/dL    RDW 13.1 11.6 - 15.1 %    MPV 9.5 8.9 - 12.7 fL    Platelets 256 149 - 390 Thousands/uL    nRBC 0 /100 WBCs    Segmented % 66 43 - 75 %    Immature Grans % 1 0 - 2 %    Lymphocytes % 23 14 - 44 %    Monocytes % 7 4 - 12 %    Eosinophils Relative 2 0 - 6 %    Basophils Relative 1 0 - 1 %    Absolute Neutrophils 5.55 1.85 - 7.62 Thousands/µL    Absolute Immature Grans 0.05 0.00 - 0.20 Thousand/uL    Absolute Lymphocytes 1.86 0.60 - 4.47 Thousands/µL    Absolute Monocytes 0.60 0.17 - 1.22 Thousand/µL    Eosinophils Absolute 0.15 0.00 - 0.61 Thousand/µL    Basophils Absolute 0.05 0.00 - 0.10 Thousands/µL   Comprehensive metabolic panel    Collection Time: 07/06/25  1:15 PM   Result Value Ref Range    Sodium 135 135 - 147 mmol/L    Potassium 4.2 3.5 - 5.3 mmol/L    Chloride 100 96 - 108 mmol/L    CO2 29 21 - 32 mmol/L    ANION GAP 6 4 - 13 mmol/L    BUN 9 5 - 25 mg/dL    Creatinine 0.59 (L) 0.60 - 1.30 mg/dL    Glucose 119 65 - 140 mg/dL    Calcium 9.2 8.4 - 10.2 mg/dL    AST 20 13 - 39 U/L    ALT 24 7 - 52 U/L    Alkaline Phosphatase 89 34 - 104 U/L    Total Protein 7.1 6.4 - 8.4 g/dL    Albumin 4.2 3.5 - 5.0 g/dL    Total Bilirubin 0.62 0.20 - 1.00 mg/dL    eGFR 89 ml/min/1.73sq m   HS Troponin I 2hr    Collection Time: 07/06/25  1:48 PM   Result Value Ref Range    hs TnI 2hr 6 \"Refer to ACS Flowchart\"- see link ng/L    Delta 2hr hsTnI 0 <20 ng/L     CTA chest pe study   Final Result      No pulmonary embolus.      Enlargement of central pulmonary arterial tree suggesting pulmonary hypertension.      No acute CT findings to account for the patient's symptoms.                  Computerized Assisted Algorithm (CAA) may have aided analysis of applicable images.      Workstation performed: [id number]         XR chest 2 views   Final Result      No acute " "cardiopulmonary disease.            Workstation performed: [id number]               Portions of the record may have been created with voice recognition software. Occasional wrong word or \"sound a like\" substitutions may have occurred due to the inherent limitations of voice recognition software.  Read the chart carefully and recognize, using context, where substitutions have occurred.          Critical Care Time  ECG 12 Lead Documentation Only    Date/Time: 7/6/2025 4:06 PM    Performed by: Bernard Ziegler MD  Authorized by: Bernard Ziegler MD    Indications / Diagnosis:  Chest pain  ECG reviewed by me, the ED Provider: yes    Patient location:  ED  Previous ECG:     Previous ECG:  Compared to current    Comparison ECG info:  May 4, 2025    Similarity:  No change    Comparison to cardiac monitor: Yes    Interpretation:     Interpretation: normal    Rate:     ECG rate:  68    ECG rate assessment: normal    Rhythm:     Rhythm: sinus rhythm    Ectopy:     Ectopy: none    QRS:     QRS axis:  Normal    QRS intervals:  Normal  Conduction:     Conduction: normal    ST segments:     ST segments:  Normal  T waves:     T waves: normal               [1]   Past Medical History:  Diagnosis Date    Psychiatric disorder    [2] No past surgical history on file.  [3]   Social History  Tobacco Use   Smoking Status Some Days    Average packs/day: 0.3 packs/day for 19.0 years (4.8 ttl pk-yrs)    Types: Cigarettes    Start date: 2005   Smokeless Tobacco Not on file   [4]   Family History  Problem Relation Name Age of Onset    Depression Mother      Depression Sister      OCD Son      Anxiety disorder Daughter      Alcohol abuse Daughter     [5]   Allergies  Allergen Reactions    Codeine Itching     Other reaction(s): MAKES PT ILL    Pollen Extract Hives and Allergic Rhinitis     "

## 2025-07-06 NOTE — DISCHARGE INSTRUCTIONS
You are seen in the emergency department for chest pain.  We performed lab work and imaging and feel that you are safe for discharge at this time.  Please follow-up with cardiology soon as possible for reevaluation.  Please return the Emergency Department should you experience any severe worsening of your symptoms, fevers, chills, or any other concerning symptoms you like evaluated.  Otherwise please follow-up with cardiology and primary care.

## 2025-07-07 ENCOUNTER — VBI (OUTPATIENT)
Dept: FAMILY MEDICINE CLINIC | Facility: CLINIC | Age: 75
End: 2025-07-07

## 2025-07-07 LAB
ATRIAL RATE: 68 BPM
P AXIS: 49 DEGREES
PR INTERVAL: 156 MS
QRS AXIS: 24 DEGREES
QRSD INTERVAL: 80 MS
QT INTERVAL: 442 MS
QTC INTERVAL: 469 MS
T WAVE AXIS: 86 DEGREES
VENTRICULAR RATE: 68 BPM

## 2025-07-07 PROCEDURE — 93010 ELECTROCARDIOGRAM REPORT: CPT | Performed by: INTERNAL MEDICINE

## 2025-07-07 NOTE — TELEPHONE ENCOUNTER
07/07/25 9:53 AM    Patient contacted post ED visit, [facility] department spoke with patient/caregiver and outreach was successful.    Thank you.  Cristina Berry MA  [facility]

## 2025-07-21 ENCOUNTER — HOSPITAL ENCOUNTER (OUTPATIENT)
Dept: RADIOLOGY | Age: 75
Discharge: HOME | End: 2025-07-21
Payer: COMMERCIAL

## 2025-07-21 VITALS — BODY MASS INDEX: 32.1 KG/M2 | HEIGHT: 61 IN | WEIGHT: 170 LBS

## 2025-07-21 DIAGNOSIS — Z78.0 ASYMPTOMATIC POSTMENOPAUSAL STATE: ICD-10-CM

## 2025-07-21 PROCEDURE — 77080 DXA BONE DENSITY AXIAL: CPT

## 2025-07-22 RX ORDER — ZIPRASIDONE HYDROCHLORIDE 60 MG/1
60 CAPSULE ORAL EVERY MORNING
Qty: 90 CAPSULE | Refills: 3 | OUTPATIENT
Start: 2025-07-22

## 2025-07-29 ENCOUNTER — PATIENT MESSAGE (OUTPATIENT)
Dept: FAMILY MEDICINE CLINIC | Facility: CLINIC | Age: 75
End: 2025-07-29

## 2025-07-29 DIAGNOSIS — M81.0 OSTEOPOROSIS WITHOUT CURRENT PATHOLOGICAL FRACTURE, UNSPECIFIED OSTEOPOROSIS TYPE: Primary | ICD-10-CM

## 2025-08-04 ENCOUNTER — TELEPHONE (OUTPATIENT)
Age: 75
End: 2025-08-04

## 2025-08-05 ENCOUNTER — TELEPHONE (OUTPATIENT)
Dept: FAMILY MEDICINE CLINIC | Facility: CLINIC | Age: 75
End: 2025-08-05

## 2025-08-06 ENCOUNTER — TELEPHONE (OUTPATIENT)
Dept: FAMILY MEDICINE CLINIC | Facility: CLINIC | Age: 75
End: 2025-08-06